# Patient Record
Sex: FEMALE | Race: WHITE | NOT HISPANIC OR LATINO | Employment: PART TIME | URBAN - NONMETROPOLITAN AREA
[De-identification: names, ages, dates, MRNs, and addresses within clinical notes are randomized per-mention and may not be internally consistent; named-entity substitution may affect disease eponyms.]

---

## 2017-02-10 ENCOUNTER — HOSPITAL ENCOUNTER (EMERGENCY)
Facility: HOSPITAL | Age: 32
Discharge: HOME | End: 2017-02-10
Admitting: FAMILY MEDICINE

## 2017-02-10 ENCOUNTER — APPOINTMENT (OUTPATIENT)
Dept: CT IMAGING | Facility: HOSPITAL | Age: 32
End: 2017-02-10

## 2017-02-10 VITALS
BODY MASS INDEX: 33.12 KG/M2 | WEIGHT: 194 LBS | DIASTOLIC BLOOD PRESSURE: 84 MMHG | SYSTOLIC BLOOD PRESSURE: 132 MMHG | TEMPERATURE: 97.8 F | HEIGHT: 64 IN | OXYGEN SATURATION: 98 % | RESPIRATION RATE: 18 BRPM | HEART RATE: 70 BPM

## 2017-02-10 DIAGNOSIS — J02.0 STREP PHARYNGITIS: ICD-10-CM

## 2017-02-10 DIAGNOSIS — R10.9 ABDOMINAL PAIN, UNSPECIFIED LOCATION: Primary | ICD-10-CM

## 2017-02-10 LAB
ALBUMIN SERPL-MCNC: 4.5 G/DL (ref 3.5–5)
ALBUMIN/GLOB SERPL: 1.4 G/DL (ref 1.1–2.5)
ALP SERPL-CCNC: 99 U/L (ref 24–120)
ALT SERPL W P-5'-P-CCNC: 35 U/L (ref 0–54)
AMYLASE SERPL-CCNC: 78 U/L (ref 30–110)
ANION GAP SERPL CALCULATED.3IONS-SCNC: 10 MMOL/L (ref 4–13)
APTT PPP: 36.5 SECONDS (ref 24.1–34.8)
AST SERPL-CCNC: 20 U/L (ref 7–45)
BASOPHILS # BLD AUTO: 0.03 10*3/MM3 (ref 0–0.2)
BASOPHILS NFR BLD AUTO: 0.4 % (ref 0–2)
BILIRUB SERPL-MCNC: 0.8 MG/DL (ref 0.1–1)
BILIRUB UR QL STRIP: NEGATIVE
BUN BLD-MCNC: 11 MG/DL (ref 5–21)
BUN/CREAT SERPL: 19.6 (ref 7–25)
CALCIUM SPEC-SCNC: 9.8 MG/DL (ref 8.4–10.4)
CHLORIDE SERPL-SCNC: 99 MMOL/L (ref 98–110)
CLARITY UR: ABNORMAL
CO2 SERPL-SCNC: 28 MMOL/L (ref 24–31)
COLOR UR: YELLOW
CREAT BLD-MCNC: 0.56 MG/DL (ref 0.5–1.4)
CRP SERPL-MCNC: 1.91 MG/DL (ref 0–0.99)
DEPRECATED RDW RBC AUTO: 39.7 FL (ref 40–54)
EOSINOPHIL # BLD AUTO: 0.04 10*3/MM3 (ref 0–0.7)
EOSINOPHIL NFR BLD AUTO: 0.5 % (ref 0–4)
ERYTHROCYTE [DISTWIDTH] IN BLOOD BY AUTOMATED COUNT: 12.1 % (ref 12–15)
FLUAV AG NPH QL: NEGATIVE
FLUBV AG NPH QL IA: NEGATIVE
GFR SERPL CREATININE-BSD FRML MDRD: 127 ML/MIN/1.73
GLOBULIN UR ELPH-MCNC: 3.2 GM/DL
GLUCOSE BLD-MCNC: 99 MG/DL (ref 70–100)
GLUCOSE UR STRIP-MCNC: NEGATIVE MG/DL
HCT VFR BLD AUTO: 40.4 % (ref 37–47)
HGB BLD-MCNC: 13.9 G/DL (ref 12–16)
HGB UR QL STRIP.AUTO: NEGATIVE
IMM GRANULOCYTES # BLD: 0.01 10*3/MM3 (ref 0–0.03)
IMM GRANULOCYTES NFR BLD: 0.1 % (ref 0–5)
INR PPP: 1.04 (ref 0.91–1.09)
KETONES UR QL STRIP: ABNORMAL
LEUKOCYTE ESTERASE UR QL STRIP.AUTO: NEGATIVE
LIPASE SERPL-CCNC: 57 U/L (ref 23–203)
LYMPHOCYTES # BLD AUTO: 1.38 10*3/MM3 (ref 0.72–4.86)
LYMPHOCYTES NFR BLD AUTO: 18.8 % (ref 15–45)
MCH RBC QN AUTO: 31.2 PG (ref 28–32)
MCHC RBC AUTO-ENTMCNC: 34.4 G/DL (ref 33–36)
MCV RBC AUTO: 90.6 FL (ref 82–98)
MONOCYTES # BLD AUTO: 0.31 10*3/MM3 (ref 0.19–1.3)
MONOCYTES NFR BLD AUTO: 4.2 % (ref 4–12)
NEUTROPHILS # BLD AUTO: 5.56 10*3/MM3 (ref 1.87–8.4)
NEUTROPHILS NFR BLD AUTO: 76 % (ref 39–78)
NITRITE UR QL STRIP: NEGATIVE
PH UR STRIP.AUTO: 7.5 [PH] (ref 5–8)
PLATELET # BLD AUTO: 225 10*3/MM3 (ref 130–400)
PMV BLD AUTO: 10.9 FL (ref 6–12)
POTASSIUM BLD-SCNC: 4 MMOL/L (ref 3.5–5.3)
PROT SERPL-MCNC: 7.7 G/DL (ref 6.3–8.7)
PROT UR QL STRIP: ABNORMAL
PROTHROMBIN TIME: 13.9 SECONDS (ref 11.9–14.6)
RBC # BLD AUTO: 4.46 10*6/MM3 (ref 4.2–5.4)
S PYO AG THROAT QL: POSITIVE
SODIUM BLD-SCNC: 137 MMOL/L (ref 135–145)
SP GR UR STRIP: 1.03 (ref 1–1.03)
UROBILINOGEN UR QL STRIP: ABNORMAL
WBC NRBC COR # BLD: 7.33 10*3/MM3 (ref 4.8–10.8)

## 2017-02-10 PROCEDURE — 0 IOPAMIDOL 61 % SOLUTION: Performed by: NURSE PRACTITIONER

## 2017-02-10 PROCEDURE — 81003 URINALYSIS AUTO W/O SCOPE: CPT | Performed by: NURSE PRACTITIONER

## 2017-02-10 PROCEDURE — 83690 ASSAY OF LIPASE: CPT | Performed by: NURSE PRACTITIONER

## 2017-02-10 PROCEDURE — 86140 C-REACTIVE PROTEIN: CPT | Performed by: NURSE PRACTITIONER

## 2017-02-10 PROCEDURE — 74177 CT ABD & PELVIS W/CONTRAST: CPT

## 2017-02-10 PROCEDURE — 85025 COMPLETE CBC W/AUTO DIFF WBC: CPT | Performed by: NURSE PRACTITIONER

## 2017-02-10 PROCEDURE — 85610 PROTHROMBIN TIME: CPT | Performed by: NURSE PRACTITIONER

## 2017-02-10 PROCEDURE — 87804 INFLUENZA ASSAY W/OPTIC: CPT | Performed by: NURSE PRACTITIONER

## 2017-02-10 PROCEDURE — 96360 HYDRATION IV INFUSION INIT: CPT

## 2017-02-10 PROCEDURE — 99284 EMERGENCY DEPT VISIT MOD MDM: CPT

## 2017-02-10 PROCEDURE — 80053 COMPREHEN METABOLIC PANEL: CPT | Performed by: NURSE PRACTITIONER

## 2017-02-10 PROCEDURE — 85730 THROMBOPLASTIN TIME PARTIAL: CPT | Performed by: NURSE PRACTITIONER

## 2017-02-10 PROCEDURE — 82150 ASSAY OF AMYLASE: CPT | Performed by: NURSE PRACTITIONER

## 2017-02-10 PROCEDURE — 87880 STREP A ASSAY W/OPTIC: CPT | Performed by: NURSE PRACTITIONER

## 2017-02-10 RX ORDER — ONDANSETRON 4 MG/1
4 TABLET, ORALLY DISINTEGRATING ORAL EVERY 6 HOURS PRN
Qty: 12 TABLET | Refills: 0 | Status: SHIPPED | OUTPATIENT
Start: 2017-02-10 | End: 2017-02-13

## 2017-02-10 RX ORDER — SODIUM CHLORIDE 0.9 % (FLUSH) 0.9 %
10 SYRINGE (ML) INJECTION AS NEEDED
Status: DISCONTINUED | OUTPATIENT
Start: 2017-02-10 | End: 2017-02-11 | Stop reason: HOSPADM

## 2017-02-10 RX ORDER — AMOXICILLIN AND CLAVULANATE POTASSIUM 875; 125 MG/1; MG/1
1 TABLET, FILM COATED ORAL 2 TIMES DAILY
Qty: 14 TABLET | Refills: 0 | Status: SHIPPED | OUTPATIENT
Start: 2017-02-10 | End: 2017-02-17

## 2017-02-10 RX ORDER — METHYLPREDNISOLONE 4 MG/1
TABLET ORAL
Qty: 21 TABLET | Refills: 0 | OUTPATIENT
Start: 2017-02-10 | End: 2018-12-16 | Stop reason: HOSPADM

## 2017-02-10 RX ADMIN — SODIUM CHLORIDE 1000 ML: 9 INJECTION, SOLUTION INTRAVENOUS at 20:45

## 2017-02-10 RX ADMIN — IOPAMIDOL 100 ML: 612 INJECTION, SOLUTION INTRAVENOUS at 22:18

## 2017-02-11 NOTE — ED PROVIDER NOTES
Subjective   HPI Comments: Patient is a 31-year-old white female presents here today with several complaints.  Patient reports that she has been having right lower quadrant pain for 3 days.  She reports nausea.  She denies any constipation or diarrhea she denies any difficulties.  Patient has vaginal bleeding or discharge.  Patient also reports bilateral earache and sore throat.  She presents  For past 2 days.  Patient reports also that she does have a cough.  She reports that she called apply last evening.  States this occurred times one.  She denies any night sweats she denies any exposure to tuberculosis.  Patient presents ER today for further evaluation.    Patient is a 31 y.o. female presenting with abdominal pain.   History provided by:  Patient   used: No    Abdominal Pain   Pain location:  RLQ  Pain quality: cramping    Pain radiates to:  Does not radiate  Pain severity:  Mild  Onset quality:  Sudden  Duration:  3 days  Timing:  Constant  Progression:  Worsening  Chronicity:  New  Context: not alcohol use, not awakening from sleep, not diet changes, not eating, not laxative use, not medication withdrawal, not previous surgeries, not recent illness, not recent sexual activity, not recent travel, not retching, not sick contacts, not suspicious food intake and not trauma    Relieved by:  Nothing  Worsened by:  Nothing  Ineffective treatments:  None tried  Associated symptoms: cough and nausea    Associated symptoms: no anorexia, no belching, no chest pain, no chills, no constipation, no diarrhea, no dysuria, no fatigue, no fever, no flatus, no hematemesis, no hematochezia, no hematuria, no melena, no shortness of breath, no sore throat, no vaginal bleeding, no vaginal discharge and no vomiting    Risk factors: no alcohol abuse, no aspirin use, not elderly, has not had multiple surgeries, no NSAID use, not obese, not pregnant and no recent hospitalization        Review of Systems    Constitutional: Negative for chills, fatigue and fever.   HENT: Negative for sore throat.    Respiratory: Positive for cough. Negative for shortness of breath.    Cardiovascular: Negative for chest pain.   Gastrointestinal: Positive for abdominal pain and nausea. Negative for anorexia, constipation, diarrhea, flatus, hematemesis, hematochezia, melena and vomiting.   Genitourinary: Negative for dysuria, hematuria, vaginal bleeding and vaginal discharge.   All other systems reviewed and are negative.      Past Medical History   Diagnosis Date   • Cat scratch fever    • Hypertension    • Precancerous changes of the cervix        No Known Allergies    Past Surgical History   Procedure Laterality Date   •  section     • Neck surgery     • Hysterectomy         History reviewed. No pertinent family history.    Social History     Social History   • Marital status:      Spouse name: N/A   • Number of children: N/A   • Years of education: N/A     Social History Main Topics   • Smoking status: Never Smoker   • Smokeless tobacco: None   • Alcohol use No   • Drug use: No   • Sexual activity: Not Asked     Other Topics Concern   • None     Social History Narrative   • None           Objective   Physical Exam   Constitutional: She is oriented to person, place, and time. She appears well-developed and well-nourished.   HENT:   Head: Normocephalic and atraumatic.   Right Ear: Hearing, external ear and ear canal normal. There is tenderness. Tympanic membrane is erythematous.   Left Ear: Hearing, tympanic membrane, external ear and ear canal normal.   Eyes: Conjunctivae are normal. Pupils are equal, round, and reactive to light.   Neck: Normal range of motion. Neck supple.   Cardiovascular: Normal rate, regular rhythm and normal heart sounds.    Pulmonary/Chest: Effort normal and breath sounds normal.   Abdominal: Soft. Normal appearance and bowel sounds are normal. There is tenderness in the right lower quadrant.    Musculoskeletal: Normal range of motion.   Neurological: She is alert and oriented to person, place, and time.   Skin: Skin is warm and dry.   Psychiatric: She has a normal mood and affect.   Nursing note and vitals reviewed.      Procedures         ED Course  ED Course   Comment By Time   Is reviewed at this time.  Her CBC was unremarkable.  CMP unremarkable.  Her CRP is 1.9, strep a test positive.  CT abdomen and pelvis shows no acute findings.  This was read by the [facility] radiology.  At this time patient be discharged home urination.  I will give her prescription for Augmentin to treat the strep infection.  Patient is advised to follow-up your primary care provider on Monday for recheck.  I will also give her a prescription for Medrol Dosepak to treat the strep throat.  Patient is advised to return to the ER as needed sooner if any new or worsening symptoms. Meeta Samuel, APRN 02/10 2301                  MDM  Number of Diagnoses or Management Options  Abdominal pain, unspecified location: new and requires workup  Strep pharyngitis: new and requires workup     Amount and/or Complexity of Data Reviewed  Clinical lab tests: ordered and reviewed  Tests in the radiology section of CPT®: ordered and reviewed    Patient Progress  Patient progress: stable      Final diagnoses:   Abdominal pain, unspecified location   Strep pharyngitis            Meeta Samuel, LYUBOV  02/10/17 3860

## 2017-02-11 NOTE — ED NOTES
Additional pillow obtained for pt's comfort. No other request at this time.     Cinthia Vogel RN  02/10/17 3562

## 2017-02-11 NOTE — DISCHARGE INSTRUCTIONS
Please use medications as prescribed.  Follow-up to primary care provider on Monday.  Return to ER as needed sooner if any new or worsening symptoms.

## 2017-10-10 ENCOUNTER — HOSPITAL ENCOUNTER (EMERGENCY)
Facility: HOSPITAL | Age: 32
Discharge: HOME | End: 2017-10-10
Attending: EMERGENCY MEDICINE | Admitting: EMERGENCY MEDICINE

## 2017-10-10 VITALS
TEMPERATURE: 98.5 F | HEART RATE: 90 BPM | SYSTOLIC BLOOD PRESSURE: 145 MMHG | OXYGEN SATURATION: 98 % | BODY MASS INDEX: 36.55 KG/M2 | RESPIRATION RATE: 14 BRPM | HEIGHT: 64 IN | DIASTOLIC BLOOD PRESSURE: 86 MMHG | WEIGHT: 214.1 LBS

## 2017-10-10 DIAGNOSIS — L50.9 URTICARIA: Primary | ICD-10-CM

## 2017-10-10 LAB
ALBUMIN SERPL-MCNC: 4.3 G/DL (ref 3.5–5)
ALBUMIN/GLOB SERPL: 1.4 G/DL (ref 1.1–2.5)
ALP SERPL-CCNC: 109 U/L (ref 24–120)
ALT SERPL W P-5'-P-CCNC: 34 U/L (ref 0–54)
ANION GAP SERPL CALCULATED.3IONS-SCNC: 8 MMOL/L (ref 4–13)
AST SERPL-CCNC: 25 U/L (ref 7–45)
BASOPHILS # BLD AUTO: 0.01 10*3/MM3 (ref 0–0.2)
BASOPHILS NFR BLD AUTO: 0.1 % (ref 0–2)
BILIRUB SERPL-MCNC: 0.3 MG/DL (ref 0.1–1)
BUN BLD-MCNC: 17 MG/DL (ref 5–21)
BUN/CREAT SERPL: 23 (ref 7–25)
CALCIUM SPEC-SCNC: 9.5 MG/DL (ref 8.4–10.4)
CHLORIDE SERPL-SCNC: 103 MMOL/L (ref 98–110)
CO2 SERPL-SCNC: 28 MMOL/L (ref 24–31)
CREAT BLD-MCNC: 0.74 MG/DL (ref 0.5–1.4)
DEPRECATED RDW RBC AUTO: 39.4 FL (ref 40–54)
EOSINOPHIL # BLD AUTO: 0.03 10*3/MM3 (ref 0–0.7)
EOSINOPHIL NFR BLD AUTO: 0.3 % (ref 0–4)
ERYTHROCYTE [DISTWIDTH] IN BLOOD BY AUTOMATED COUNT: 12.3 % (ref 12–15)
GFR SERPL CREATININE-BSD FRML MDRD: 92 ML/MIN/1.73
GLOBULIN UR ELPH-MCNC: 3.1 GM/DL
GLUCOSE BLD-MCNC: 88 MG/DL (ref 70–100)
HCT VFR BLD AUTO: 41.7 % (ref 37–47)
HGB BLD-MCNC: 14.3 G/DL (ref 12–16)
IMM GRANULOCYTES # BLD: 0.03 10*3/MM3 (ref 0–0.03)
IMM GRANULOCYTES NFR BLD: 0.3 % (ref 0–5)
LYMPHOCYTES # BLD AUTO: 1.13 10*3/MM3 (ref 0.72–4.86)
LYMPHOCYTES NFR BLD AUTO: 10.2 % (ref 15–45)
MCH RBC QN AUTO: 30.4 PG (ref 28–32)
MCHC RBC AUTO-ENTMCNC: 34.3 G/DL (ref 33–36)
MCV RBC AUTO: 88.7 FL (ref 82–98)
MONOCYTES # BLD AUTO: 0.48 10*3/MM3 (ref 0.19–1.3)
MONOCYTES NFR BLD AUTO: 4.3 % (ref 4–12)
NEUTROPHILS # BLD AUTO: 9.4 10*3/MM3 (ref 1.87–8.4)
NEUTROPHILS NFR BLD AUTO: 84.8 % (ref 39–78)
PLATELET # BLD AUTO: 273 10*3/MM3 (ref 130–400)
PMV BLD AUTO: 10.8 FL (ref 6–12)
POTASSIUM BLD-SCNC: 4.4 MMOL/L (ref 3.5–5.3)
PROT SERPL-MCNC: 7.4 G/DL (ref 6.3–8.7)
RBC # BLD AUTO: 4.7 10*6/MM3 (ref 4.2–5.4)
SODIUM BLD-SCNC: 139 MMOL/L (ref 135–145)
WBC NRBC COR # BLD: 11.08 10*3/MM3 (ref 4.8–10.8)

## 2017-10-10 PROCEDURE — 25010000002 METHYLPREDNISOLONE PER 125 MG: Performed by: EMERGENCY MEDICINE

## 2017-10-10 PROCEDURE — 96361 HYDRATE IV INFUSION ADD-ON: CPT

## 2017-10-10 PROCEDURE — 25010000002 DIPHENHYDRAMINE PER 50 MG: Performed by: EMERGENCY MEDICINE

## 2017-10-10 PROCEDURE — 96374 THER/PROPH/DIAG INJ IV PUSH: CPT

## 2017-10-10 PROCEDURE — 96375 TX/PRO/DX INJ NEW DRUG ADDON: CPT

## 2017-10-10 PROCEDURE — 80053 COMPREHEN METABOLIC PANEL: CPT | Performed by: EMERGENCY MEDICINE

## 2017-10-10 PROCEDURE — 85025 COMPLETE CBC W/AUTO DIFF WBC: CPT | Performed by: EMERGENCY MEDICINE

## 2017-10-10 PROCEDURE — 99283 EMERGENCY DEPT VISIT LOW MDM: CPT

## 2017-10-10 RX ORDER — DIPHENHYDRAMINE HCL 25 MG
25 CAPSULE ORAL EVERY 6 HOURS PRN
Qty: 15 CAPSULE | Refills: 0 | Status: SHIPPED | OUTPATIENT
Start: 2017-10-10 | End: 2019-01-29

## 2017-10-10 RX ORDER — METHYLPREDNISOLONE SODIUM SUCCINATE 125 MG/2ML
125 INJECTION, POWDER, LYOPHILIZED, FOR SOLUTION INTRAMUSCULAR; INTRAVENOUS ONCE
Status: COMPLETED | OUTPATIENT
Start: 2017-10-10 | End: 2017-10-10

## 2017-10-10 RX ORDER — PREDNISONE 20 MG/1
20 TABLET ORAL DAILY
Qty: 5 TABLET | Refills: 0 | OUTPATIENT
Start: 2017-10-10 | End: 2018-12-16 | Stop reason: HOSPADM

## 2017-10-10 RX ORDER — DIPHENHYDRAMINE HYDROCHLORIDE 50 MG/ML
25 INJECTION INTRAMUSCULAR; INTRAVENOUS ONCE
Status: COMPLETED | OUTPATIENT
Start: 2017-10-10 | End: 2017-10-10

## 2017-10-10 RX ORDER — FAMOTIDINE 20 MG/1
20 TABLET, FILM COATED ORAL 2 TIMES DAILY
Qty: 14 TABLET | Refills: 0 | Status: SHIPPED | OUTPATIENT
Start: 2017-10-10 | End: 2019-01-29

## 2017-10-10 RX ORDER — FAMOTIDINE 10 MG/ML
20 INJECTION, SOLUTION INTRAVENOUS ONCE
Status: COMPLETED | OUTPATIENT
Start: 2017-10-10 | End: 2017-10-10

## 2017-10-10 RX ORDER — SODIUM CHLORIDE 0.9 % (FLUSH) 0.9 %
10 SYRINGE (ML) INJECTION AS NEEDED
Status: DISCONTINUED | OUTPATIENT
Start: 2017-10-10 | End: 2017-10-10 | Stop reason: HOSPADM

## 2017-10-10 RX ADMIN — METHYLPREDNISOLONE SODIUM SUCCINATE 125 MG: 125 INJECTION, POWDER, FOR SOLUTION INTRAMUSCULAR; INTRAVENOUS at 08:47

## 2017-10-10 RX ADMIN — DIPHENHYDRAMINE HYDROCHLORIDE 25 MG: 50 INJECTION, SOLUTION INTRAMUSCULAR; INTRAVENOUS at 08:49

## 2017-10-10 RX ADMIN — SODIUM CHLORIDE 500 ML: 9 INJECTION, SOLUTION INTRAVENOUS at 08:55

## 2017-10-10 RX ADMIN — FAMOTIDINE 20 MG: 10 INJECTION, SOLUTION INTRAVENOUS at 08:50

## 2017-10-10 NOTE — ED PROVIDER NOTES
Subjective patient is a 31-year-old female who presents to the ER with a rash.  Patient states she first noticed a red rash approximately 24 hours ago.  Patient states this started on her arms and abdomen and has spread diffusely throughout her entire body.  Patient states the rash is itchy and painful.  Patient has never had anything like this previously.  Patient states she feels like her lip is swelling but denies any significant shortness of air.  Patient denies any new medications, soaps, detergents, foods.  Patient does not take daily medications.  Patient has taken Benadryl at home with no improvement.  Patient denies any fever, chest pain, shortness of breath, abdominal pain, nausea vomiting diarrhea, urinary changes, neurological changes.  Patient denies any recent illness.    History provided by:  Patient   used: No        Review of Systems   Constitutional: Negative.    HENT: Negative.    Eyes: Negative.    Respiratory: Negative.    Cardiovascular: Negative.    Gastrointestinal: Negative.    Endocrine: Negative.    Genitourinary: Negative.    Musculoskeletal: Negative.    Skin: Positive for rash.   Allergic/Immunologic: Negative.    Neurological: Negative.    Hematological: Negative.    Psychiatric/Behavioral: Negative.    All other systems reviewed and are negative.      Past Medical History:   Diagnosis Date   • Cat scratch fever    • Hypertension    • Precancerous changes of the cervix        No Known Allergies    Past Surgical History:   Procedure Laterality Date   •  SECTION     • HYSTERECTOMY     • NECK SURGERY         History reviewed. No pertinent family history.    Social History     Social History   • Marital status:      Spouse name: N/A   • Number of children: N/A   • Years of education: N/A     Social History Main Topics   • Smoking status: Never Smoker   • Smokeless tobacco: None   • Alcohol use No   • Drug use: No   • Sexual activity: Not Asked     Other  Topics Concern   • None     Social History Narrative           Objective   Physical Exam   Constitutional: She is oriented to person, place, and time. She appears well-developed and well-nourished.   HENT:   Head: Normocephalic and atraumatic.   Mouth/Throat: Oropharynx is clear and moist. No posterior oropharyngeal edema or posterior oropharyngeal erythema. No tonsillar exudate.   No tongue swelling, no obvious lip swelling   Eyes: Conjunctivae are normal. Pupils are equal, round, and reactive to light.   Neck: Normal range of motion.   Cardiovascular: Normal rate, regular rhythm and normal heart sounds.    Pulmonary/Chest: Effort normal and breath sounds normal.   Abdominal: Soft. There is no tenderness.   Musculoskeletal: Normal range of motion. She exhibits no edema or deformity.   Neurological: She is alert and oriented to person, place, and time. She has normal strength.   Skin: Skin is warm.   Erythematous macular rash noted diffusely throughout the body consistent with urticaria not involving the palms and soles, some areas are more raised and palpable   Psychiatric: She has a normal mood and affect. Her behavior is normal.   Nursing note and vitals reviewed.      Procedures         ED Course  ED Course    Patient is given IV fluids, Solu-Medrol, Benadryl and Pepcid.  Symptoms improved.  Labs are unremarkable except mild leukocytosis.  Workup consistent with urticaria versus vasculitis.  Patient was feeling better and had no difficulty breathing.  Normal vital signs.  Patient will be discharged home with prednisone, Pepcid and Benadryl to follow up with PCP.  Patient may need further workup or biopsy if symptoms persist.  Return immediately for any worsening or new rash, trouble breathing or swallowing, fever or other concerns.    Lab Results (last 24 hours)     Procedure Component Value Units Date/Time    CBC & Differential [[id number]] Collected:  10/10/17 0845    Specimen:  Blood Updated:  10/10/17 0910     Narrative:       The following orders were created for panel order CBC & Differential.  Procedure                               Abnormality         Status                     ---------                               -----------         ------                     CBC Auto Differential[[id number]]         Abnormal            Final result                 Please view results for these tests on the individual orders.    Comprehensive Metabolic Panel [[id number]] Collected:  10/10/17 0845    Specimen:  Blood from Arm, Right Updated:  10/10/17 0917     Glucose 88 mg/dL      BUN 17 mg/dL      Creatinine 0.74 mg/dL      Sodium 139 mmol/L      Potassium 4.4 mmol/L      Chloride 103 mmol/L      CO2 28.0 mmol/L      Calcium 9.5 mg/dL      Total Protein 7.4 g/dL      Albumin 4.30 g/dL      ALT (SGPT) 34 U/L      AST (SGOT) 25 U/L      Alkaline Phosphatase 109 U/L      Total Bilirubin 0.3 mg/dL      eGFR Non African Amer 92 mL/min/1.73      Globulin 3.1 gm/dL      A/G Ratio 1.4 g/dL      BUN/Creatinine Ratio 23.0     Anion Gap 8.0 mmol/L     CBC Auto Differential [[id number]]  (Abnormal) Collected:  10/10/17 0845    Specimen:  Blood from Arm, Right Updated:  10/10/17 0910     WBC 11.08 (H) 10*3/mm3      RBC 4.70 10*6/mm3      Hemoglobin 14.3 g/dL      Hematocrit 41.7 %      MCV 88.7 fL      MCH 30.4 pg      MCHC 34.3 g/dL      RDW 12.3 %      RDW-SD 39.4 (L) fl      MPV 10.8 fL      Platelets 273 10*3/mm3      Neutrophil % 84.8 (H) %      Lymphocyte % 10.2 (L) %      Monocyte % 4.3 %      Eosinophil % 0.3 %      Basophil % 0.1 %      Immature Grans % 0.3 %      Neutrophils, Absolute 9.40 (H) 10*3/mm3      Lymphocytes, Absolute 1.13 10*3/mm3      Monocytes, Absolute 0.48 10*3/mm3      Eosinophils, Absolute 0.03 10*3/mm3      Basophils, Absolute 0.01 10*3/mm3      Immature Grans, Absolute 0.03 10*3/mm3                     MDM  Number of Diagnoses or Management Options      Final diagnoses:   Urticaria            Sofía CHASE Schultz,  MD  10/10/17 1008

## 2017-12-15 ENCOUNTER — TRANSCRIBE ORDERS (OUTPATIENT)
Dept: ADMINISTRATIVE | Facility: HOSPITAL | Age: 32
End: 2017-12-15

## 2017-12-15 DIAGNOSIS — R10.9 ABDOMINAL PAIN, UNSPECIFIED ABDOMINAL LOCATION: ICD-10-CM

## 2017-12-15 DIAGNOSIS — K58.0 IRRITABLE BOWEL SYNDROME WITH DIARRHEA: Primary | ICD-10-CM

## 2017-12-17 ENCOUNTER — HOSPITAL ENCOUNTER (EMERGENCY)
Facility: HOSPITAL | Age: 32
Discharge: HOME | End: 2017-12-17
Admitting: EMERGENCY MEDICINE

## 2017-12-17 VITALS
OXYGEN SATURATION: 98 % | SYSTOLIC BLOOD PRESSURE: 141 MMHG | HEIGHT: 64 IN | BODY MASS INDEX: 37.22 KG/M2 | DIASTOLIC BLOOD PRESSURE: 82 MMHG | TEMPERATURE: 98.3 F | WEIGHT: 218 LBS | HEART RATE: 80 BPM | RESPIRATION RATE: 16 BRPM

## 2017-12-17 DIAGNOSIS — R51.9 NONINTRACTABLE HEADACHE, UNSPECIFIED CHRONICITY PATTERN, UNSPECIFIED HEADACHE TYPE: Primary | ICD-10-CM

## 2017-12-17 PROCEDURE — 25010000002 KETOROLAC TROMETHAMINE PER 15 MG: Performed by: NURSE PRACTITIONER

## 2017-12-17 PROCEDURE — 99283 EMERGENCY DEPT VISIT LOW MDM: CPT

## 2017-12-17 PROCEDURE — 96372 THER/PROPH/DIAG INJ SC/IM: CPT

## 2017-12-17 RX ORDER — KETOROLAC TROMETHAMINE 30 MG/ML
30 INJECTION, SOLUTION INTRAMUSCULAR; INTRAVENOUS ONCE
Status: COMPLETED | OUTPATIENT
Start: 2017-12-17 | End: 2017-12-17

## 2017-12-17 RX ADMIN — KETOROLAC TROMETHAMINE 30 MG: 30 INJECTION INTRAMUSCULAR; INTRAVENOUS at 19:23

## 2017-12-18 NOTE — ED PROVIDER NOTES
Subjective   Patient is a 32 y.o. female presenting with headaches.   History provided by:  Patient   used: No    Headache   Pain location:  L temporal  Quality:  Dull  Radiates to:  Does not radiate  Severity currently:  8/10  Onset quality:  Sudden  Duration:  1 day  Timing:  Constant  Progression:  Unchanged  Chronicity:  Recurrent  Similar to prior headaches: yes    Context: bright light    Context: not activity, not caffeine, not coughing, not defecating, not eating, not stress, not exposure to cold air, not intercourse, not loud noise and not straining    Relieved by:  Nothing  Worsened by:  Nothing  Ineffective treatments:  None tried  Associated symptoms: no abdominal pain, no back pain, no blurred vision, no congestion, no cough, no diarrhea, no dizziness, no drainage, no ear pain, no eye pain, no facial pain, no fatigue, no fever, no focal weakness, no hearing loss, no loss of balance, no myalgias, no nausea, no near-syncope, no neck pain, no neck stiffness, no numbness, no paresthesias, no photophobia, no seizures, no sinus pressure, no sore throat, no swollen glands, no syncope, no tingling, no URI, no visual change, no vomiting and no weakness    Risk factors: no anger, no family hx of SAH, does not have insomnia and lifestyle not sedentary        Review of Systems   Constitutional: Negative for fatigue and fever.   HENT: Negative for congestion, ear pain, hearing loss, postnasal drip, sinus pressure and sore throat.    Eyes: Negative for blurred vision, photophobia and pain.   Respiratory: Negative for cough.    Cardiovascular: Negative for syncope and near-syncope.   Gastrointestinal: Negative for abdominal pain, diarrhea, nausea and vomiting.   Musculoskeletal: Negative for back pain, myalgias, neck pain and neck stiffness.   Neurological: Positive for headaches. Negative for dizziness, focal weakness, seizures, weakness, numbness, paresthesias and loss of balance.   All other  systems reviewed and are negative.      Past Medical History:   Diagnosis Date   • Cat scratch fever    • Hypertension    • Precancerous changes of the cervix        No Known Allergies    Past Surgical History:   Procedure Laterality Date   •  SECTION     • HYSTERECTOMY     • NECK SURGERY         History reviewed. No pertinent family history.    Social History     Social History   • Marital status:      Spouse name: N/A   • Number of children: N/A   • Years of education: N/A     Social History Main Topics   • Smoking status: Never Smoker   • Smokeless tobacco: None   • Alcohol use No   • Drug use: No   • Sexual activity: Not Asked     Other Topics Concern   • None     Social History Narrative           Objective   Physical Exam   Constitutional: She is oriented to person, place, and time. She appears well-developed.   HENT:   Head: Normocephalic and atraumatic.   Right Ear: External ear normal.   Left Ear: External ear normal.   Nose: Nose normal.   Mouth/Throat: Oropharynx is clear and moist.   Cardiovascular: Normal rate, regular rhythm and normal heart sounds.    Pulmonary/Chest: Effort normal and breath sounds normal.   Musculoskeletal: Normal range of motion.   Neurological: She is alert and oriented to person, place, and time.   Skin: Skin is warm and dry.   Psychiatric: She has a normal mood and affect.   Nursing note and vitals reviewed.      Procedures         ED Course  ED Course   Comment By Time   Patient request nonnarcotic medication as she does have to work tonight. We will give her a toradol injection. Patient declines a workup for her headache including laboratory aspirin.  He states the same as her normal headache.  At this time she will be discharged home in stable condition advised to return to ER if any new or worsening symptoms. Meeta Samuel, APRN 1918                  [facility]  Number of Diagnoses or Management Options  Nonintractable headache, unspecified chronicity  pattern, unspecified headache type: established and worsening  Patient Progress  Patient progress: stable      Final diagnoses:   Nonintractable headache, unspecified chronicity pattern, unspecified headache type            Meeta Samuel, APRN  12/17/17 1919

## 2017-12-19 ENCOUNTER — HOSPITAL ENCOUNTER (OUTPATIENT)
Dept: NUCLEAR MEDICINE | Facility: HOSPITAL | Age: 32
Discharge: HOME | End: 2017-12-19

## 2017-12-19 ENCOUNTER — HOSPITAL ENCOUNTER (OUTPATIENT)
Dept: ULTRASOUND IMAGING | Facility: HOSPITAL | Age: 32
Discharge: HOME | End: 2017-12-19
Admitting: NURSE PRACTITIONER

## 2017-12-19 DIAGNOSIS — R10.9 ABDOMINAL PAIN, UNSPECIFIED ABDOMINAL LOCATION: ICD-10-CM

## 2017-12-19 DIAGNOSIS — K58.0 IRRITABLE BOWEL SYNDROME WITH DIARRHEA: ICD-10-CM

## 2017-12-19 PROCEDURE — A9537 TC99M MEBROFENIN: HCPCS | Performed by: NURSE PRACTITIONER

## 2017-12-19 PROCEDURE — 76705 ECHO EXAM OF ABDOMEN: CPT

## 2017-12-19 PROCEDURE — 78227 HEPATOBIL SYST IMAGE W/DRUG: CPT

## 2017-12-19 PROCEDURE — 0 TECHNETIUM TC 99M MEBROFENIN KIT: Performed by: NURSE PRACTITIONER

## 2017-12-19 RX ORDER — KIT FOR THE PREPARATION OF TECHNETIUM TC 99M MEBROFENIN 45 MG/10ML
1 INJECTION, POWDER, LYOPHILIZED, FOR SOLUTION INTRAVENOUS
Status: COMPLETED | OUTPATIENT
Start: 2017-12-19 | End: 2017-12-19

## 2017-12-19 RX ADMIN — MEBROFENIN 1 DOSE: 45 INJECTION, POWDER, LYOPHILIZED, FOR SOLUTION INTRAVENOUS at 12:15

## 2018-07-16 ENCOUNTER — HOSPITAL ENCOUNTER (EMERGENCY)
Facility: HOSPITAL | Age: 33
Discharge: HOME | End: 2018-07-16
Admitting: EMERGENCY MEDICINE

## 2018-07-16 VITALS
HEIGHT: 64 IN | SYSTOLIC BLOOD PRESSURE: 136 MMHG | DIASTOLIC BLOOD PRESSURE: 88 MMHG | RESPIRATION RATE: 18 BRPM | HEART RATE: 72 BPM | WEIGHT: 190 LBS | TEMPERATURE: 97 F | OXYGEN SATURATION: 99 % | BODY MASS INDEX: 32.44 KG/M2

## 2018-07-16 DIAGNOSIS — H57.89 IRRITATION OF RIGHT EYE: Primary | ICD-10-CM

## 2018-07-16 PROCEDURE — 99283 EMERGENCY DEPT VISIT LOW MDM: CPT

## 2018-07-16 RX ADMIN — FLUORESCEIN SODIUM AND PROPARACAINE HYDROCHLORIDE 1 DROP: 2.5; 5 SOLUTION/ DROPS OPHTHALMIC at 07:34

## 2018-12-16 ENCOUNTER — HOSPITAL ENCOUNTER (EMERGENCY)
Facility: HOSPITAL | Age: 33
Discharge: HOME | End: 2018-12-16
Admitting: EMERGENCY MEDICINE

## 2018-12-16 ENCOUNTER — APPOINTMENT (OUTPATIENT)
Dept: GENERAL RADIOLOGY | Facility: HOSPITAL | Age: 33
End: 2018-12-16

## 2018-12-16 VITALS
OXYGEN SATURATION: 100 % | WEIGHT: 234.4 LBS | SYSTOLIC BLOOD PRESSURE: 153 MMHG | BODY MASS INDEX: 40.02 KG/M2 | HEART RATE: 106 BPM | RESPIRATION RATE: 16 BRPM | HEIGHT: 64 IN | DIASTOLIC BLOOD PRESSURE: 109 MMHG | TEMPERATURE: 98.6 F

## 2018-12-16 DIAGNOSIS — Z72.0 TOBACCO ABUSE: ICD-10-CM

## 2018-12-16 DIAGNOSIS — J18.9 PNEUMONIA DUE TO INFECTIOUS ORGANISM, UNSPECIFIED LATERALITY, UNSPECIFIED PART OF LUNG: Primary | ICD-10-CM

## 2018-12-16 LAB
FLUAV AG NPH QL: NEGATIVE
FLUBV AG NPH QL IA: NEGATIVE
S PYO AG THROAT QL: NEGATIVE

## 2018-12-16 PROCEDURE — 94799 UNLISTED PULMONARY SVC/PX: CPT

## 2018-12-16 PROCEDURE — 25010000002 CEFTRIAXONE PER 250 MG: Performed by: NURSE PRACTITIONER

## 2018-12-16 PROCEDURE — 87804 INFLUENZA ASSAY W/OPTIC: CPT | Performed by: NURSE PRACTITIONER

## 2018-12-16 PROCEDURE — 87081 CULTURE SCREEN ONLY: CPT | Performed by: NURSE PRACTITIONER

## 2018-12-16 PROCEDURE — 94640 AIRWAY INHALATION TREATMENT: CPT

## 2018-12-16 PROCEDURE — 71046 X-RAY EXAM CHEST 2 VIEWS: CPT

## 2018-12-16 PROCEDURE — 96372 THER/PROPH/DIAG INJ SC/IM: CPT

## 2018-12-16 PROCEDURE — 99283 EMERGENCY DEPT VISIT LOW MDM: CPT

## 2018-12-16 PROCEDURE — 87880 STREP A ASSAY W/OPTIC: CPT | Performed by: NURSE PRACTITIONER

## 2018-12-16 RX ORDER — IPRATROPIUM BROMIDE AND ALBUTEROL SULFATE 2.5; .5 MG/3ML; MG/3ML
3 SOLUTION RESPIRATORY (INHALATION) ONCE
Status: COMPLETED | OUTPATIENT
Start: 2018-12-16 | End: 2018-12-16

## 2018-12-16 RX ORDER — METHYLPREDNISOLONE 4 MG/1
TABLET ORAL
Qty: 21 TABLET | Refills: 0 | Status: SHIPPED | OUTPATIENT
Start: 2018-12-16 | End: 2019-01-29

## 2018-12-16 RX ORDER — CEFDINIR 300 MG/1
300 CAPSULE ORAL 2 TIMES DAILY
Qty: 20 CAPSULE | Refills: 0 | Status: SHIPPED | OUTPATIENT
Start: 2018-12-16 | End: 2018-12-26

## 2018-12-16 RX ORDER — ALBUTEROL SULFATE 2.5 MG/3ML
2.5 SOLUTION RESPIRATORY (INHALATION) EVERY 4 HOURS PRN
Qty: 25 VIAL | Refills: 0 | Status: SHIPPED | OUTPATIENT
Start: 2018-12-16 | End: 2019-01-29

## 2018-12-16 RX ORDER — DEXTROMETHORPHAN HYDROBROMIDE AND PROMETHAZINE HYDROCHLORIDE 15; 6.25 MG/5ML; MG/5ML
5 SYRUP ORAL 4 TIMES DAILY PRN
Qty: 180 ML | Refills: 0 | Status: SHIPPED | OUTPATIENT
Start: 2018-12-16 | End: 2019-01-29

## 2018-12-16 RX ORDER — IBUPROFEN 800 MG/1
800 TABLET ORAL ONCE
Status: COMPLETED | OUTPATIENT
Start: 2018-12-16 | End: 2018-12-16

## 2018-12-16 RX ADMIN — IPRATROPIUM BROMIDE AND ALBUTEROL SULFATE 3 ML: 2.5; .5 SOLUTION RESPIRATORY (INHALATION) at 08:01

## 2018-12-16 RX ADMIN — CEFTRIAXONE SODIUM 1000 MG: 1 INJECTION, POWDER, FOR SOLUTION INTRAMUSCULAR; INTRAVENOUS at 09:02

## 2018-12-16 RX ADMIN — IBUPROFEN 800 MG: 800 TABLET ORAL at 07:57

## 2018-12-16 NOTE — DISCHARGE INSTRUCTIONS
Return to ER if symptoms worsen   Increase oral fluids  Stop smoking  Follow up with primary doctor in 3 days if needed  Follow up with one of the [facility] physician groups below to setup primary care. If you have trouble following up, please call the [facility] Nurse Line at [phone]    (Dr. Nelson Miller DO, Dr. Maximiliano Sanchez DO,  LYUBOV Jacob, and LYUBOV Hernandez)  [facility], Primary Care   [address], [address], [place] [address]  [phone]     (Dr. May Parish MD, LYUBOV Berry, and LYUBOV Greenberg)  [facility], Primary Care   [address] [facility] [address] [address], [place] [address] [phone]    (Dr. Luis Manuel Lerma MD and Dr. Julian Solomon MD)  [facility], Primary Care  [address], [address] [phone]    (Dr. Godfrey Courtney MD)  [facility], Primary Care  [address], [address], [place], [place], [address] [phone]      Community-Acquired Pneumonia, Adult  Pneumonia is an infection of the lungs. There are different types of pneumonia. One type can develop while a person is in a hospital. A different type, called community-acquired pneumonia, develops in people who are not, or have not recently been, in the hospital or other health care facility.  What are the causes?  Pneumonia may be caused by bacteria, viruses, or funguses. Community-acquired pneumonia is often caused by Streptococcus pneumonia bacteria. These bacteria are often passed from one person to another by breathing in droplets from the cough or sneeze of an infected person.  What increases the risk?  The condition is more likely to develop in:  · People who have chronic diseases, such as chronic obstructive pulmonary disease (COPD), asthma, congestive heart failure, cystic fibrosis, diabetes, or kidney disease.  · People who have early-stage or late-stage  HIV.  · People who have sickle cell disease.  · People who have had their spleen removed (splenectomy).  · People who have poor dental hygiene.  · People who have medical conditions that increase the risk of breathing in (aspirating) secretions their own mouth and nose.  · People who have a weakened immune system (immunocompromised).  · People who smoke.  · People who travel to areas where pneumonia-causing germs commonly exist.  · People who are around animal habitats or animals that have pneumonia-causing germs, including birds, bats, rabbits, cats, and farm animals.    What are the signs or symptoms?  Symptoms of this condition include:  · A dry cough.  · A wet (productive) cough.  · Fever.  · Sweating.  · Chest pain, especially when breathing deeply or coughing.  · Rapid breathing or difficulty breathing.  · Shortness of breath.  · Shaking chills.  · Fatigue.  · Muscle aches.    How is this diagnosed?  Your health care provider will take a medical history and perform a physical exam. You may also have other tests, including:  · Imaging studies of your chest, including X-rays.  · Tests to check your blood oxygen level and other blood gases.  · Other tests on blood, mucus (sputum), fluid around your lungs (pleural fluid), and urine.    If your pneumonia is severe, other tests may be done to identify the specific cause of your illness.  How is this treated?  The type of treatment that you receive depends on many factors, such as the cause of your pneumonia, the medicines you take, and other medical conditions that you have. For most adults, treatment and recovery from pneumonia may occur at home. In some cases, treatment must happen in a hospital. Treatment may include:  · Antibiotic medicines, if the pneumonia was caused by bacteria.  · Antiviral medicines, if the pneumonia was caused by a virus.  · Medicines that are given by mouth or through an IV tube.  · Oxygen.  · Respiratory therapy.    Although rare,  treating severe pneumonia may include:  · Mechanical ventilation. This is done if you are not breathing well on your own and you cannot maintain a safe blood oxygen level.  · Thoracentesis. This procedure removes fluid around one lung or both lungs to help you breathe better.    Follow these instructions at home:  · Take over-the-counter and prescription medicines only as told by your health care provider.  ? Only take cough medicine if you are losing sleep. Understand that cough medicine can prevent your body’s natural ability to remove mucus from your lungs.  ? If you were prescribed an antibiotic medicine, take it as told by your health care provider. Do not stop taking the antibiotic even if you start to feel better.  · Sleep in a semi-upright position at night. Try sleeping in a reclining chair, or place a few pillows under your head.  · Do not use tobacco products, including cigarettes, chewing tobacco, and e-cigarettes. If you need help quitting, ask your health care provider.  · Drink enough water to keep your urine clear or pale yellow. This will help to thin out mucus secretions in your lungs.  How is this prevented?  There are ways that you can decrease your risk of developing community-acquired pneumonia. Consider getting a pneumococcal vaccine if:  · You are older than 65 years of age.  · You are older than 19 years of age and are undergoing cancer treatment, have chronic lung disease, or have other medical conditions that affect your immune system. Ask your health care provider if this applies to you.    There are different types and schedules of pneumococcal vaccines. Ask your health care provider which vaccination option is best for you.  You may also prevent community-acquired pneumonia if you take these actions:  · Get an influenza vaccine every year. Ask your health care provider which type of influenza vaccine is best for you.  · Go to the dentist on a regular basis.  · Wash your hands often. Use  hand  if soap and water are not available.    Contact a health care provider if:  · You have a fever.  · You are losing sleep because you cannot control your cough with cough medicine.  Get help right away if:  · You have worsening shortness of breath.  · You have increased chest pain.  · Your sickness becomes worse, especially if you are an older adult or have a weakened immune system.  · You cough up blood.  This information is not intended to replace advice given to you by your health care provider. Make sure you discuss any questions you have with your health care provider.  Document Released: 12/18/2006 Document Revised: 04/27/2017 Document Reviewed: 04/13/2016  [employer] Interactive Patient Education © 2018 [employer]      Coping with Quitting Smoking  Quitting smoking is a physical and mental challenge. You will face cravings, withdrawal symptoms, and temptation. Before quitting, work with your health care provider to make a plan that can help you cope. Preparation can help you quit and keep you from giving in.  How can I cope with cravings?  Cravings usually last for 5-10 minutes. If you get through it, the craving will pass. Consider taking the following actions to help you cope with cravings:  · Keep your mouth busy:  ? Chew sugar-free gum.  ? Suck on hard candies or a straw.  ? Brush your teeth.  · Keep your hands and body busy:  ? Immediately change to a different activity when you feel a craving.  ? Squeeze or play with a ball.  ? Do an activity or a hobby, like making bead jewelry, practicing needlepoint, or working with wood.  ? Mix up your normal routine.  ? Take a short exercise break. Go for a quick walk or run up and down stairs.  ? Spend time in public places where smoking is not allowed.  · Focus on doing something kind or helpful for someone else.  · Call a friend or family member to talk during a craving.  · Join a support group.  · Call a quit line, such as ThumbAd800-QUIT-NOW.  · Talk  with your health care provider about medicines that might help you cope with cravings and make quitting easier for you.    How can I deal with withdrawal symptoms?  Your body may experience negative effects as it tries to get used to not having nicotine in the system. These effects are called withdrawal symptoms. They may include:  · Feeling hungrier than normal.  · Trouble concentrating.  · Irritability.  · Trouble sleeping.  · Feeling depressed.  · Restlessness and agitation.  · Craving a cigarette.    To manage withdrawal symptoms:  · Avoid places, people, and activities that trigger your cravings.  · Remember why you want to quit.  · Get plenty of sleep.  · Avoid coffee and other caffeinated drinks. These may worsen some of your symptoms.    How can I handle social situations?  Social situations can be difficult when you are quitting smoking, especially in the first few weeks. To manage this, you can:  · Avoid parties, bars, and other social situations where people might be smoking.  · Avoid alcohol.  · Leave right away if you have the urge to smoke.  · Explain to your family and friends that you are quitting smoking. Ask for understanding and support.  · Plan activities with friends or family where smoking is not an option.    What are some ways I can cope with stress?  Wanting to smoke may cause stress, and stress can make you want to smoke. Find ways to manage your stress. Relaxation techniques can help. For example:  · Breathe slowly and deeply, in through your nose and out through your mouth.  · Listen to soothing, relaxing music.  · Talk with a family member or friend about your stress.  · Light a candle.  · Soak in a bath or take a shower.  · Think about a peaceful place.    What are some ways I can prevent weight gain?  Be aware that many people gain weight after they quit smoking. However, not everyone does. To keep from gaining weight, have a plan in place before you quit and stick to the plan after  you quit. Your plan should include:  · Having healthy snacks. When you have a craving, it may help to:  ? Eat plain popcorn, crunchy carrots, celery, or other cut vegetables.  ? Chew sugar-free gum.  · Changing how you eat:  ? Eat small portion sizes at meals.  ? Eat 4-6 small meals throughout the day instead of 1-2 large meals a day.  ? Be mindful when you eat. Do not watch television or do other things that might distract you as you eat.  · Exercising regularly:  ? Make time to exercise each day. If you do not have time for a long workout, do short bouts of exercise for 5-10 minutes several times a day.  ? Do some form of strengthening exercise, like weight lifting, and some form of aerobic exercise, like running or swimming.  · Drinking plenty of water or other low-calorie or no-calorie drinks. Drink 6-8 glasses of water daily, or as much as instructed by your health care provider.    Summary  · Quitting smoking is a physical and mental challenge. You will face cravings, withdrawal symptoms, and temptation to smoke again. Preparation can help you as you go through these challenges.  · You can cope with cravings by keeping your mouth busy (such as by chewing gum), keeping your body and hands busy, and making calls to family, friends, or a helpline for people who want to quit smoking.  · You can cope with withdrawal symptoms by avoiding places where people smoke, avoiding drinks with caffeine, and getting plenty of rest.  · Ask your health care provider about the different ways to prevent weight gain, avoid stress, and handle social situations.  This information is not intended to replace advice given to you by your health care provider. Make sure you discuss any questions you have with your health care provider.  Document Released: 12/15/2017 Document Revised: 12/15/2017 Document Reviewed: 12/15/2017  [employer] Interactive Patient Education © 2018 [employer]      Health Risks of Smoking  Smoking cigarettes is  very bad for your health. Tobacco smoke has over 200 known poisons in it. It contains the poisonous gases nitrogen oxide and carbon monoxide. There are over 60 chemicals in tobacco smoke that cause cancer.  Smoking is difficult to quit because a chemical in tobacco, called nicotine, causes addiction or dependence. When you smoke and inhale, nicotine is absorbed rapidly into the bloodstream through your lungs. Both inhaled and non-inhaled nicotine may be addictive.  What are the risks of cigarette smoke?  Cigarette smokers have an increased risk of many serious medical problems, including:  · Lung cancer.  · Lung disease, such as pneumonia, bronchitis, and emphysema.  · Chest pain (angina) and heart attack because the heart is not getting enough oxygen.  · Heart disease and peripheral blood vessel disease.  · High blood pressure (hypertension).  · Stroke.  · Oral cancer, including cancer of the lip, mouth, or voice box.  · Bladder cancer.  · Pancreatic cancer.  · Cervical cancer.  · Pregnancy complications, including premature birth.  · Stillbirths and smaller  babies, birth defects, and genetic damage to sperm.  · Early menopause.  · Lower estrogen level for women.  · Infertility.  · Facial wrinkles.  · Blindness.  · Increased risk of broken bones (fractures).  · Senile dementia.  · Stomach ulcers and internal bleeding.  · Delayed wound healing and increased risk of complications during surgery.  · Even smoking lightly shortens your life expectancy by several years.    Because of secondhand smoke exposure, children of smokers have an increased risk of the following:  · Sudden infant death syndrome (SIDS).  · Respiratory infections.  · Lung cancer.  · Heart disease.  · Ear infections.    What are the benefits of quitting?  There are many health benefits of quitting smoking. Here are some of them:  · Within days of quitting smoking, your risk of having a heart attack decreases, your blood flow improves, and  your lung capacity improves. Blood pressure, pulse rate, and breathing patterns start returning to normal soon after quitting.  · Within months, your lungs may clear up completely.  · Quitting for 10 years reduces your risk of developing lung cancer and heart disease to almost that of a nonsmoker.  · People who quit may see an improvement in their overall quality of life.    How do I quit smoking?  Smoking is an addiction with both physical and psychological effects, and longtime habits can be hard to change. Your health care provider can recommend:  · Programs and community resources, which may include group support, education, or talk therapy.  · Prescription medicines to help reduce cravings.  · Nicotine replacement products, such as patches, gum, and nasal sprays. Use these products only as directed. Do not replace cigarette smoking with electronic cigarettes, which are commonly called e-cigarettes. The safety of e-cigarettes is not known, and some may contain harmful chemicals.  · A combination of two or more of these methods.    Where to find more information:  · American Lung Association: www.lung.org  · American Cancer Society: www.cancer.org  Summary  · Smoking cigarettes is very bad for your health. Cigarette smokers have an increased risk of many serious medical problems, including several cancers, heart disease, and stroke.  · Smoking is an addiction with both physical and psychological effects, and longtime habits can be hard to change.  · By stopping right away, you can greatly reduce the risk of medical problems for you and your family.  · To help you quit smoking, your health care provider can recommend programs, community resources, prescription medicines, and nicotine replacement products such as patches, gum, and nasal sprays.  This information is not intended to replace advice given to you by your health care provider. Make sure you discuss any questions you have with your health care  provider.  Document Released: 01/25/2006 Document Revised: 12/22/2017 Document Reviewed: 12/22/2017  [employer] Interactive Patient Education © 2017 [employer]

## 2018-12-16 NOTE — ED PROVIDER NOTES
Subjective   Pt is a 32 yo white female presents with cough, congestion, fever, and body aches. She states that she has had interment nausea or vomiting. Pt denies abd pain. No diarrhea. Pt states that she did not get the influenza vaccine.  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    HENT: Negative.    Eyes: Negative.    Respiratory:        Pt is a 32 yo white female presents with cough, congestion, fever, and body aches. She states that she has had interment nausea or vomiting. Pt denies abd pain. No diarrhea. Pt states that she did not get the influenza vaccine.  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    Gastrointestinal: Negative.    Endocrine: Negative.    Genitourinary: Negative.    Musculoskeletal: Negative.    Skin: Negative.    Allergic/Immunologic: Negative.    Neurological: Negative.    Hematological: Negative.    Psychiatric/Behavioral: Negative.    All other systems reviewed and are negative.      Past Medical History:   Diagnosis Date   • Cat scratch fever    • Hypertension    • Precancerous changes of the cervix        No Known Allergies    Past Surgical History:   Procedure Laterality Date   •  SECTION     • HYSTERECTOMY     • NECK SURGERY         History reviewed. No pertinent family history.    Social History     Socioeconomic History   • Marital status:      Spouse name: Not on file   • Number of children: Not on file   • Years of education: Not on file   • Highest education level: Not on file   Tobacco Use   • Smoking status: Never Smoker   • Smokeless tobacco: Never Used   Substance and Sexual Activity   • Alcohol use: No   • Drug use: No   • Sexual activity: Defer  "      Prior to Admission medications    Medication Sig Start Date End Date Taking? Authorizing Provider   diphenhydrAMINE (BENADRYL) 25 mg capsule Take 1 capsule by mouth Every 6 (Six) Hours As Needed for Itching. 10/10/17   Sofía Schultz MD   famotidine (PEPCID) 20 MG tablet Take 1 tablet by mouth 2 (Two) Times a Day. 10/10/17   Sofía Schultz MD   MethylPREDNISolone (MEDROL, BRITTNEY,) 4 MG tablet Take as directed on package instructions. 2/10/17   Meeta Samuel APRN   predniSONE (DELTASONE) 20 MG tablet Take 1 tablet by mouth Daily. 10/10/17   Sofía Schultz MD   VALTREX 1 G tablet Take 1 tablet by mouth Daily. 11/10/16   Liseth Hawkins APRN       BP (!) 148/105 (BP Location: Left arm, Patient Position: Sitting)   Pulse 107   Temp 98.3 °F (36.8 °C) (Oral)   Resp 16   Ht 162.6 cm (64\")   Wt 106 kg (234 lb 6.4 oz)   SpO2 99%   BMI 40.23 kg/m²     Objective   Physical Exam   Constitutional: She is oriented to person, place, and time. She appears well-developed and well-nourished.   HENT:   Head: Normocephalic and atraumatic.   O/p sl eryth wih thick clear pnd. No exudate noted   Eyes: Conjunctivae and EOM are normal. Pupils are equal, round, and reactive to light.   Neck: Normal range of motion. Neck supple. No JVD present. No tracheal deviation present. No thyromegaly present.   Cardiovascular: Normal rate, regular rhythm, normal heart sounds and intact distal pulses.   Pulmonary/Chest: Effort normal. No respiratory distress. She has no wheezes. She has no rales. She exhibits no tenderness.   Mild expiratory wheezing, clears with cough    Abdominal: Soft. Bowel sounds are normal.   Musculoskeletal: Normal range of motion.   Lymphadenopathy:     She has no cervical adenopathy.   Neurological: She is alert and oriented to person, place, and time. She has normal reflexes. No cranial nerve deficit.   Skin: Skin is warm and dry.   Psychiatric: She has a normal mood and affect. Her behavior is " normal. Judgment and thought content normal.   Nursing note and vitals reviewed.      Procedures         Lab Results (last 24 hours)     Procedure Component Value Units Date/Time    Influenza Antigen, Rapid - Swab, Nasopharynx [[id number]]  (Normal) Collected:  12/16/18 0755    Specimen:  Swab from Nasopharynx Updated:  12/16/18 0823     Influenza A Ag, EIA Negative     Influenza B Ag, EIA Negative    Narrative:       Recommend confirmation of negative results by viral culture or molecular assay.    Rapid Strep A Screen - Swab, Throat [[id number]]  (Normal) Collected:  12/16/18 0755    Specimen:  Swab from Throat Updated:  12/16/18 0819     Strep A Ag Negative    Beta Strep Culture, Throat - Swab, Throat [[id number]] Collected:  12/16/18 0755    Specimen:  Swab from Throat Updated:  12/16/18 0819          XR Chest 2 View   Final Result   1. Mild patchy right perihilar airspace opacities, right middle and   right upper lobe, compatible with acute infectious/inflammatory change,   pneumonia.           This report was finalized on 12/16/2018 08:08 by Dr. Vladimir Mcfarland MD.          ED Course  ED Course as of Dec 16 0912   Sun Dec 16, 2018   0856 Reviewed results of testing with pt and pt family. Pt will be sent home with atbs, neb machine, steroids, and cough medications. Advised pt to increase oral fluids. Advised to stop smoking as well. Advised to follow up in 3-4 days if no improvement- advised to return to er if needed  [CW]      ED Course User Index  [CW] Lynne Porras, APRN          MDM  Number of Diagnoses or Management Options  Pneumonia due to infectious organism, unspecified laterality, unspecified part of lung: minor  Tobacco abuse: minor     Amount and/or Complexity of Data Reviewed  Clinical lab tests: ordered and reviewed  Tests in the radiology section of CPT®: ordered and reviewed    Patient Progress  Patient progress: stable      Final diagnoses:   Pneumonia due to infectious organism, unspecified  laterality, unspecified part of lung   Tobacco abuse          Lynne Porras, APRN  12/16/18 0933

## 2018-12-18 LAB — BACTERIA SPEC AEROBE CULT: NORMAL

## 2019-01-29 ENCOUNTER — OFFICE VISIT (OUTPATIENT)
Dept: OBSTETRICS AND GYNECOLOGY | Facility: CLINIC | Age: 34
End: 2019-01-29

## 2019-01-29 VITALS
BODY MASS INDEX: 39.78 KG/M2 | HEIGHT: 64 IN | SYSTOLIC BLOOD PRESSURE: 110 MMHG | WEIGHT: 233 LBS | DIASTOLIC BLOOD PRESSURE: 80 MMHG

## 2019-01-29 DIAGNOSIS — Z78.9 NON-SMOKER: ICD-10-CM

## 2019-01-29 DIAGNOSIS — Z01.419 ENCOUNTER FOR GYNECOLOGICAL EXAMINATION WITHOUT ABNORMAL FINDING: Primary | ICD-10-CM

## 2019-01-29 DIAGNOSIS — Z11.3 SCREEN FOR STD (SEXUALLY TRANSMITTED DISEASE): ICD-10-CM

## 2019-01-29 PROCEDURE — 87481 CANDIDA DNA AMP PROBE: CPT | Performed by: NURSE PRACTITIONER

## 2019-01-29 PROCEDURE — 99395 PREV VISIT EST AGE 18-39: CPT | Performed by: NURSE PRACTITIONER

## 2019-01-29 PROCEDURE — 87661 TRICHOMONAS VAGINALIS AMPLIF: CPT | Performed by: NURSE PRACTITIONER

## 2019-01-29 PROCEDURE — 87798 DETECT AGENT NOS DNA AMP: CPT | Performed by: NURSE PRACTITIONER

## 2019-01-29 PROCEDURE — 87491 CHLMYD TRACH DNA AMP PROBE: CPT | Performed by: NURSE PRACTITIONER

## 2019-01-29 PROCEDURE — 87512 GARDNER VAG DNA QUANT: CPT | Performed by: NURSE PRACTITIONER

## 2019-01-29 PROCEDURE — 87591 N.GONORRHOEAE DNA AMP PROB: CPT | Performed by: NURSE PRACTITIONER

## 2019-01-29 RX ORDER — VALACYCLOVIR HYDROCHLORIDE 1 G/1
1000 TABLET, FILM COATED ORAL DAILY
Qty: 30 TABLET | Refills: 11 | Status: SHIPPED | OUTPATIENT
Start: 2019-01-29 | End: 2022-06-24

## 2019-01-29 RX ORDER — FLUCONAZOLE 150 MG/1
150 TABLET ORAL EVERY OTHER DAY
Qty: 2 TABLET | Refills: 0 | Status: SHIPPED | OUTPATIENT
Start: 2019-01-29 | End: 2019-02-01

## 2019-01-29 RX ORDER — CLOTRIMAZOLE AND BETAMETHASONE DIPROPIONATE 10; .64 MG/G; MG/G
CREAM TOPICAL 2 TIMES DAILY
Qty: 15 G | Refills: 0 | Status: SHIPPED | OUTPATIENT
Start: 2019-01-29 | End: 2019-04-17

## 2019-01-29 NOTE — PATIENT INSTRUCTIONS

## 2019-01-29 NOTE — PROGRESS NOTES
"Ashley Lacy is a 33 y.o. female.     Annual exam    Vagina is numb, do not feel anything with intercourse.       The following portions of the patient's history were reviewed and updated as appropriate: allergies, current medications, past family history, past medical history, past social history, past surgical history and problem list.    /80 (BP Location: Left arm, Patient Position: Sitting, Cuff Size: Large Adult)   Ht 162.6 cm (64\")   Wt 106 kg (233 lb)   BMI 39.99 kg/m²     Review of Systems   Constitutional: Negative for activity change, appetite change, fatigue and fever.   HENT: Negative for congestion, sore throat and trouble swallowing.    Eyes: Negative for pain, discharge and visual disturbance.   Respiratory: Negative for apnea, shortness of breath and wheezing.    Cardiovascular: Negative for chest pain, palpitations and leg swelling.   Gastrointestinal: Negative for abdominal pain, constipation and diarrhea.   Genitourinary: Positive for vaginal discharge. Negative for frequency, pelvic pain and urgency.        Vaginal numbness     Musculoskeletal: Negative for back pain and gait problem.   Skin: Negative for color change and rash.   Neurological: Negative for dizziness, weakness and numbness.   Psychiatric/Behavioral: Negative for confusion and sleep disturbance.       Objective   Physical Exam   Constitutional: She is oriented to person, place, and time. She appears well-developed and well-nourished. No distress.   HENT:   Head: Normocephalic.   Right Ear: External ear normal.   Left Ear: External ear normal.   Nose: Nose normal.   Mouth/Throat: Oropharynx is clear and moist.   Eyes: Conjunctivae are normal. Right eye exhibits no discharge. Left eye exhibits no discharge. No scleral icterus.   Neck: Normal range of motion. Neck supple. Carotid bruit is not present. No tracheal deviation present. No thyromegaly present.   Cardiovascular: Normal rate, regular rhythm, normal heart " sounds and intact distal pulses.   No murmur heard.  Pulmonary/Chest: Effort normal and breath sounds normal. No respiratory distress. She has no wheezes. Right breast exhibits no inverted nipple, no mass, no nipple discharge, no skin change and no tenderness. Left breast exhibits no inverted nipple, no mass, no nipple discharge, no skin change and no tenderness. Breasts are symmetrical. There is no breast swelling.   Abdominal: Soft. She exhibits no distension and no mass. There is no tenderness. There is no guarding. No hernia. Hernia confirmed negative in the right inguinal area and confirmed negative in the left inguinal area.   Genitourinary: Rectum normal. Rectal exam shows no mass. No breast tenderness, discharge or bleeding. Pelvic exam was performed with patient supine. There is rash and tenderness on the right labia. There is no lesion or injury on the right labia. There is rash and tenderness on the left labia. There is no lesion or injury on the left labia. Cervix exhibits no discharge. Right adnexum displays no mass, no tenderness and no fullness. Left adnexum displays no mass, no tenderness and no fullness. There is tenderness in the vagina. No erythema or bleeding in the vagina. No foreign body in the vagina. No signs of injury around the vagina. Vaginal discharge found.   Genitourinary Comments:   BSU normal  Urethral meatus  Normal  Perineum  Normal   Musculoskeletal: Normal range of motion. She exhibits no edema or tenderness.   Lymphadenopathy:        Head (right side): No submental, no submandibular, no tonsillar, no preauricular, no posterior auricular and no occipital adenopathy present.        Head (left side): No submental, no submandibular, no tonsillar, no preauricular, no posterior auricular and no occipital adenopathy present.     She has no cervical adenopathy.        Right cervical: No superficial cervical, no deep cervical and no posterior cervical adenopathy present.       Left  cervical: No superficial cervical, no deep cervical and no posterior cervical adenopathy present.     She has no axillary adenopathy.        Right: No inguinal adenopathy present.        Left: No inguinal adenopathy present.   Neurological: She is alert and oriented to person, place, and time. Coordination normal.   Skin: Skin is warm and dry. No bruising and no rash noted. She is not diaphoretic. No erythema.   Psychiatric: She has a normal mood and affect. Her behavior is normal. Judgment and thought content normal.   Nursing note and vitals reviewed.      Assessment/Plan   Well woman exam. Pt reports MD lesley Vega in [facility], jaqueline BSO, r/t adnomyosis    Discussed pt vaginal symptoms.   Specimen collected for BV panel, gonorrhea and chlamydia testing.  Will treat with Diflucan and Lotrisone.     Pt desires refill of Valtrex. Will refill.     Patient's Body mass index is 39.99 kg/m². BMI is above normal parameters. Recommendations include: educational material.    RV well woman exam/pn.   May was seen today for gynecologic exam.    Diagnoses and all orders for this visit:    Encounter for gynecological examination without abnormal finding    Screen for STD (sexually transmitted disease)  -     Gynecologic Fluid, Supplemental Testing    BMI 40.0-44.9, adult (CMS/[facility])    Non-smoker    Other orders  -     clotrimazole-betamethasone (LOTRISONE) 1-0.05 % cream; Apply  topically to the appropriate area as directed 2 (Two) Times a Day.  -     valACYclovir (VALTREX) 1000 MG tablet; Take 1 tablet by mouth Daily.  -     fluconazole (DIFLUCAN) 150 MG tablet; Take 1 tablet by mouth Every Other Day for 2 doses.

## 2019-02-04 LAB
GEN CATEG CVX/VAG CYTO-IMP: NORMAL
LAB AP CASE REPORT: NORMAL
Lab: NORMAL
PATH INTERP SPEC-IMP: NORMAL
STAT OF ADQ CVX/VAG CYTO-IMP: NORMAL

## 2019-02-04 RX ORDER — DOXYCYCLINE 100 MG/1
100 CAPSULE ORAL 2 TIMES DAILY
Qty: 14 CAPSULE | Refills: 0 | Status: SHIPPED | OUTPATIENT
Start: 2019-02-04 | End: 2019-02-11

## 2019-02-28 ENCOUNTER — OFFICE VISIT (OUTPATIENT)
Dept: OBSTETRICS AND GYNECOLOGY | Facility: CLINIC | Age: 34
End: 2019-02-28

## 2019-02-28 VITALS
DIASTOLIC BLOOD PRESSURE: 76 MMHG | SYSTOLIC BLOOD PRESSURE: 118 MMHG | BODY MASS INDEX: 39.44 KG/M2 | WEIGHT: 231 LBS | HEIGHT: 64 IN

## 2019-02-28 DIAGNOSIS — N94.9 LABIAL BURNING: Primary | ICD-10-CM

## 2019-02-28 DIAGNOSIS — Z78.9 NON-SMOKER: ICD-10-CM

## 2019-02-28 PROCEDURE — 99213 OFFICE O/P EST LOW 20 MIN: CPT | Performed by: NURSE PRACTITIONER

## 2019-04-17 ENCOUNTER — PROCEDURE VISIT (OUTPATIENT)
Dept: OBSTETRICS AND GYNECOLOGY | Facility: CLINIC | Age: 34
End: 2019-04-17

## 2019-04-17 ENCOUNTER — OFFICE VISIT (OUTPATIENT)
Dept: OBSTETRICS AND GYNECOLOGY | Facility: CLINIC | Age: 34
End: 2019-04-17

## 2019-04-17 VITALS
BODY MASS INDEX: 40.8 KG/M2 | DIASTOLIC BLOOD PRESSURE: 80 MMHG | WEIGHT: 239 LBS | HEIGHT: 64 IN | SYSTOLIC BLOOD PRESSURE: 130 MMHG

## 2019-04-17 DIAGNOSIS — R10.2 PELVIC PAIN: Primary | ICD-10-CM

## 2019-04-17 DIAGNOSIS — Z78.9 NON-SMOKER: ICD-10-CM

## 2019-04-17 DIAGNOSIS — R53.83 FATIGUE, UNSPECIFIED TYPE: ICD-10-CM

## 2019-04-17 PROCEDURE — 76830 TRANSVAGINAL US NON-OB: CPT | Performed by: OBSTETRICS & GYNECOLOGY

## 2019-04-17 PROCEDURE — 99213 OFFICE O/P EST LOW 20 MIN: CPT | Performed by: NURSE PRACTITIONER

## 2019-04-17 RX ORDER — ERGOCALCIFEROL 1.25 MG/1
CAPSULE ORAL
COMMUNITY
Start: 2019-03-31 | End: 2019-04-25 | Stop reason: SDUPTHER

## 2019-04-17 NOTE — PROGRESS NOTES
"Subjective   May Lacy is a 33 y.o. female.     Follow up  Pelvic pain  US today      The following portions of the patient's history were reviewed and updated as appropriate: allergies, current medications, past family history, past medical history, past social history, past surgical history and problem list.    /80 (BP Location: Left arm, Patient Position: Sitting, Cuff Size: Large Adult)   Ht 162.6 cm (64\")   Wt 108 kg (239 lb)   BMI 41.02 kg/m²     Review of Systems   Constitutional: Negative for activity change, appetite change, fatigue and fever.   Respiratory: Negative for apnea and shortness of breath.    Cardiovascular: Negative for chest pain and palpitations.   Gastrointestinal: Negative for abdominal distention, abdominal pain, constipation, diarrhea, nausea and vomiting.   Endocrine: Negative for cold intolerance and heat intolerance.   Genitourinary: Positive for dyspareunia and pelvic pain. Negative for difficulty urinating, frequency, menstrual problem, vaginal discharge and vaginal pain.   Neurological: Negative for headaches.   Psychiatric/Behavioral: Negative for agitation and sleep disturbance.       Objective   Physical Exam   Constitutional: She is oriented to person, place, and time. She appears well-developed.   Eyes: Right eye exhibits no discharge. Left eye exhibits no discharge.   Cardiovascular: Normal rate and regular rhythm.   Pulmonary/Chest: Effort normal and breath sounds normal.   Neurological: She is alert and oriented to person, place, and time.   Skin: Skin is warm.   Psychiatric: She has a normal mood and affect. Her behavior is normal. Judgment and thought content normal.   Vitals reviewed.      Assessment/Plan   Discussed US with pt.   US indicates unable to appreciate ovaries.     Discussed dyspareunia.   Hormone labs today.   Pt to return to discuss results.     Patient's Body mass index is 41.02 kg/m². BMI is above normal parameters. Recommendations include: " educational material.    RV discuss results/prn.   May was seen today for pelvic pain.    Diagnoses and all orders for this visit:    Pelvic pain  -     Cortisol  -     DHEA-Sulfate  -     Estradiol  -     Follicle Stimulating Hormone  -     Luteinizing Hormone  -     Progesterone  -     Testosterone  -     T4, Free  -     TSH  -     Vitamin D 25 Hydroxy    BMI 40.0-44.9, adult (CMS/[facility])    Non-smoker    Fatigue, unspecified type  -     Vitamin D 25 Hydroxy

## 2019-04-18 LAB
25(OH)D3+25(OH)D2 SERPL-MCNC: 21.5 NG/ML (ref 30–100)
CORTIS SERPL-MCNC: 4.1 UG/DL
DHEA-S SERPL-MCNC: 171.9 UG/DL (ref 84.8–378)
ESTRADIOL SERPL-MCNC: 18.3 PG/ML
FSH SERPL-ACNC: 77.4 MIU/ML
LH SERPL-ACNC: 38.8 MIU/ML
PROGEST SERPL-MCNC: 0.1 NG/ML
T4 FREE SERPL-MCNC: 1.29 NG/DL (ref 0.93–1.7)
TESTOST SERPL-MCNC: 9 NG/DL (ref 8–48)
TSH SERPL DL<=0.005 MIU/L-ACNC: 2.32 MIU/ML (ref 0.27–4.2)

## 2019-04-25 RX ORDER — ERGOCALCIFEROL 1.25 MG/1
50000 CAPSULE ORAL WEEKLY
Qty: 8 CAPSULE | Refills: 0 | Status: SHIPPED | OUTPATIENT
Start: 2019-04-25 | End: 2019-06-14

## 2019-05-08 ENCOUNTER — OFFICE VISIT (OUTPATIENT)
Dept: OBSTETRICS AND GYNECOLOGY | Facility: CLINIC | Age: 34
End: 2019-05-08

## 2019-05-08 VITALS
BODY MASS INDEX: 40.63 KG/M2 | DIASTOLIC BLOOD PRESSURE: 80 MMHG | SYSTOLIC BLOOD PRESSURE: 120 MMHG | HEIGHT: 64 IN | WEIGHT: 238 LBS

## 2019-05-08 DIAGNOSIS — Z79.890 POSTMENOPAUSAL HRT (HORMONE REPLACEMENT THERAPY): ICD-10-CM

## 2019-05-08 DIAGNOSIS — Z71.2 ENCOUNTER TO DISCUSS TEST RESULTS: Primary | ICD-10-CM

## 2019-05-08 DIAGNOSIS — R10.2 PELVIC PAIN: ICD-10-CM

## 2019-05-08 DIAGNOSIS — E55.9 VITAMIN D DEFICIENCY: ICD-10-CM

## 2019-05-08 DIAGNOSIS — R53.83 FATIGUE, UNSPECIFIED TYPE: ICD-10-CM

## 2019-05-08 DIAGNOSIS — R35.0 URINE FREQUENCY: ICD-10-CM

## 2019-05-08 DIAGNOSIS — N95.1 MENOPAUSAL SYMPTOM: ICD-10-CM

## 2019-05-08 PROCEDURE — 99213 OFFICE O/P EST LOW 20 MIN: CPT | Performed by: NURSE PRACTITIONER

## 2019-05-08 RX ORDER — ESTRADIOL 0.5 MG/1
0.5 TABLET ORAL DAILY
Qty: 30 TABLET | Refills: 2 | Status: SHIPPED | OUTPATIENT
Start: 2019-05-08 | End: 2019-06-05 | Stop reason: SDUPTHER

## 2019-05-08 NOTE — PROGRESS NOTES
"Subjective   May Lacy is a 33 y.o. female.     Discuss results.         The following portions of the patient's history were reviewed and updated as appropriate: allergies, current medications, past family history, past medical history, past social history, past surgical history and problem list.    /80 (BP Location: Left arm, Patient Position: Sitting, Cuff Size: Large Adult)   Ht 162.6 cm (64\")   Wt 108 kg (238 lb)   BMI 40.85 kg/m²     Review of Systems   Constitutional: Negative for activity change, appetite change, fatigue and fever.   Respiratory: Negative for apnea and shortness of breath.    Cardiovascular: Negative for chest pain and palpitations.   Gastrointestinal: Negative for abdominal distention, abdominal pain, constipation, diarrhea, nausea and vomiting.   Endocrine: Positive for heat intolerance. Negative for cold intolerance.        Hot flashes  Night sweats   Genitourinary: Positive for dyspareunia and pelvic pain. Negative for difficulty urinating, frequency, menstrual problem, vaginal discharge and vaginal pain.   Neurological: Negative for headaches.   Psychiatric/Behavioral: Negative for agitation and sleep disturbance.       Objective   Physical Exam   Constitutional: She is oriented to person, place, and time. She appears well-developed.   Eyes: Right eye exhibits no discharge. Left eye exhibits no discharge.   Cardiovascular: Normal rate and regular rhythm.   Pulmonary/Chest: Effort normal and breath sounds normal.   Neurological: She is alert and oriented to person, place, and time.   Skin: Skin is warm.   Psychiatric: She has a normal mood and affect. Her behavior is normal. Judgment and thought content normal.   Vitals reviewed.      Assessment/Plan    UA/culture sent r/t urine frequency.     Reviewed results from last visit.   Pt to begin vitamin D supplement.     Pt is menopausal.   Discussed options for treating symptoms including risks and benefits.   Pt opts for HRT. " Will begin low dose estradiol.   Reviewed S&S to report, pt voiced understanding.   Patient's Body mass index is 40.85 kg/m². BMI is above normal parameters. Recommendations include: educational material.    RV 1 month/prn.   May was seen today for results.    Diagnoses and all orders for this visit:    Encounter to discuss test results    Pelvic pain    Fatigue, unspecified type    Vitamin D deficiency    Urine frequency  -     Urinalysis With Culture If Indicated - Urine, Clean Catch    Menopausal symptom  -     Mammo Screening Digital Tomosynthesis Bilateral With CAD; Future    Postmenopausal HRT (hormone replacement therapy)  -     Mammo Screening Digital Tomosynthesis Bilateral With CAD; Future    Other orders  -     estradiol (ESTRACE) 0.5 MG tablet; Take 1 tablet by mouth Daily.  -     Microscopic Examination -

## 2019-05-09 LAB
APPEARANCE UR: CLEAR
BACTERIA #/AREA URNS HPF: NORMAL /HPF
BILIRUB UR QL STRIP: NEGATIVE
COLOR UR: YELLOW
EPI CELLS #/AREA URNS HPF: NORMAL /HPF
GLUCOSE UR QL: NEGATIVE
HGB UR QL STRIP: NEGATIVE
KETONES UR QL STRIP: NEGATIVE
LEUKOCYTE ESTERASE UR QL STRIP: NEGATIVE
MICRO URNS: NORMAL
MICRO URNS: NORMAL
MUCOUS THREADS URNS QL MICRO: PRESENT /HPF
NITRITE UR QL STRIP: NEGATIVE
PH UR STRIP: 5.5 [PH] (ref 5–7.5)
PROT UR QL STRIP: NEGATIVE
RBC #/AREA URNS HPF: NORMAL /HPF
SP GR UR: 1.02 (ref 1–1.03)
URINALYSIS REFLEX: NORMAL
UROBILINOGEN UR STRIP-MCNC: 0.2 MG/DL (ref 0.2–1)
WBC #/AREA URNS HPF: NORMAL /HPF

## 2019-05-17 ENCOUNTER — HOSPITAL ENCOUNTER (OUTPATIENT)
Dept: MAMMOGRAPHY | Facility: HOSPITAL | Age: 34
Discharge: HOME | End: 2019-05-17
Admitting: NURSE PRACTITIONER

## 2019-05-17 DIAGNOSIS — Z79.890 POSTMENOPAUSAL HRT (HORMONE REPLACEMENT THERAPY): ICD-10-CM

## 2019-05-17 DIAGNOSIS — N95.1 MENOPAUSAL SYMPTOM: ICD-10-CM

## 2019-05-17 PROCEDURE — 77067 SCR MAMMO BI INCL CAD: CPT

## 2019-05-17 PROCEDURE — 77063 BREAST TOMOSYNTHESIS BI: CPT

## 2019-05-19 NOTE — PATIENT INSTRUCTIONS

## 2019-06-05 ENCOUNTER — OFFICE VISIT (OUTPATIENT)
Dept: OBSTETRICS AND GYNECOLOGY | Facility: CLINIC | Age: 34
End: 2019-06-05

## 2019-06-05 VITALS
DIASTOLIC BLOOD PRESSURE: 82 MMHG | HEIGHT: 64 IN | BODY MASS INDEX: 40.97 KG/M2 | WEIGHT: 240 LBS | SYSTOLIC BLOOD PRESSURE: 128 MMHG

## 2019-06-05 DIAGNOSIS — N95.1 MENOPAUSAL SYMPTOM: ICD-10-CM

## 2019-06-05 DIAGNOSIS — Z78.9 NON-SMOKER: ICD-10-CM

## 2019-06-05 DIAGNOSIS — E55.9 VITAMIN D DEFICIENCY: ICD-10-CM

## 2019-06-05 DIAGNOSIS — Z79.890 POSTMENOPAUSAL HRT (HORMONE REPLACEMENT THERAPY): Primary | ICD-10-CM

## 2019-06-05 PROCEDURE — 99213 OFFICE O/P EST LOW 20 MIN: CPT | Performed by: NURSE PRACTITIONER

## 2019-06-05 RX ORDER — ACETAMINOPHEN 160 MG
2000 TABLET,DISINTEGRATING ORAL DAILY
COMMUNITY
End: 2022-06-24

## 2019-06-05 RX ORDER — ESTRADIOL 0.5 MG/1
0.5 TABLET ORAL DAILY
Qty: 30 TABLET | Refills: 2 | Status: SHIPPED | OUTPATIENT
Start: 2019-06-05 | End: 2022-06-24

## 2019-06-07 ENCOUNTER — HOSPITAL ENCOUNTER (EMERGENCY)
Facility: HOSPITAL | Age: 34
Discharge: HOME | End: 2019-06-07
Admitting: EMERGENCY MEDICINE

## 2019-06-07 VITALS
WEIGHT: 235 LBS | OXYGEN SATURATION: 97 % | SYSTOLIC BLOOD PRESSURE: 139 MMHG | BODY MASS INDEX: 40.12 KG/M2 | RESPIRATION RATE: 17 BRPM | TEMPERATURE: 98.7 F | DIASTOLIC BLOOD PRESSURE: 82 MMHG | HEART RATE: 104 BPM | HEIGHT: 64 IN

## 2019-06-07 DIAGNOSIS — J03.90 TONSILLITIS: Primary | ICD-10-CM

## 2019-06-07 LAB — S PYO AG THROAT QL: POSITIVE

## 2019-06-07 PROCEDURE — 25010000002 CEFTRIAXONE PER 250 MG: Performed by: NURSE PRACTITIONER

## 2019-06-07 PROCEDURE — 25010000002 DEXAMETHASONE PER 1 MG: Performed by: NURSE PRACTITIONER

## 2019-06-07 PROCEDURE — 25010000002 KETOROLAC TROMETHAMINE PER 15 MG: Performed by: NURSE PRACTITIONER

## 2019-06-07 PROCEDURE — 99283 EMERGENCY DEPT VISIT LOW MDM: CPT

## 2019-06-07 PROCEDURE — 25010000002 ONDANSETRON PER 1 MG: Performed by: NURSE PRACTITIONER

## 2019-06-07 PROCEDURE — 96372 THER/PROPH/DIAG INJ SC/IM: CPT

## 2019-06-07 PROCEDURE — 87880 STREP A ASSAY W/OPTIC: CPT | Performed by: PHYSICIAN ASSISTANT

## 2019-06-07 RX ORDER — DEXAMETHASONE SODIUM PHOSPHATE 10 MG/ML
8 INJECTION INTRAMUSCULAR; INTRAVENOUS ONCE
Status: COMPLETED | OUTPATIENT
Start: 2019-06-07 | End: 2019-06-07

## 2019-06-07 RX ORDER — KETOROLAC TROMETHAMINE 30 MG/ML
60 INJECTION, SOLUTION INTRAMUSCULAR; INTRAVENOUS ONCE
Status: COMPLETED | OUTPATIENT
Start: 2019-06-07 | End: 2019-06-07

## 2019-06-07 RX ORDER — METHYLPREDNISOLONE 4 MG/1
TABLET ORAL
Qty: 21 EACH | Refills: 0 | Status: SHIPPED | OUTPATIENT
Start: 2019-06-07 | End: 2019-08-23

## 2019-06-07 RX ORDER — AMOXICILLIN 500 MG/1
500 CAPSULE ORAL 2 TIMES DAILY
Qty: 20 CAPSULE | Refills: 0 | Status: SHIPPED | OUTPATIENT
Start: 2019-06-07 | End: 2019-06-17

## 2019-06-07 RX ORDER — ONDANSETRON 2 MG/ML
4 INJECTION INTRAMUSCULAR; INTRAVENOUS ONCE
Status: COMPLETED | OUTPATIENT
Start: 2019-06-07 | End: 2019-06-07

## 2019-06-07 RX ORDER — ONDANSETRON 4 MG/1
4 TABLET, ORALLY DISINTEGRATING ORAL EVERY 6 HOURS PRN
Qty: 10 TABLET | Refills: 0 | Status: SHIPPED | OUTPATIENT
Start: 2019-06-07 | End: 2019-08-23

## 2019-06-07 RX ADMIN — ONDANSETRON 4 MG: 2 INJECTION INTRAMUSCULAR; INTRAVENOUS at 17:25

## 2019-06-07 RX ADMIN — LIDOCAINE HYDROCHLORIDE 1 G: 10 INJECTION, SOLUTION INFILTRATION; PERINEURAL at 17:24

## 2019-06-07 RX ADMIN — DEXAMETHASONE SODIUM PHOSPHATE 8 MG: 10 INJECTION INTRAMUSCULAR; INTRAVENOUS at 17:25

## 2019-06-07 RX ADMIN — KETOROLAC TROMETHAMINE 60 MG: 60 INJECTION, SOLUTION INTRAMUSCULAR at 17:25

## 2019-06-07 NOTE — DISCHARGE INSTRUCTIONS
Push fluids; maintain fever control; get popsicles, pudding, etc that will provide comfort/soothing measures to the throat

## 2019-06-07 NOTE — ED PROVIDER NOTES
Subjective     Illness   Severity:  Moderate  Duration:  1 day  Chronicity:  New  Context:  Sore throat; pain with swallowing, reports one episode of vomiting after eating  Associated symptoms: nausea, sore throat and vomiting    Associated symptoms: no abdominal pain, no congestion, no cough, no fever, no myalgias and no rash        Review of Systems   Constitutional: Negative for fever.   HENT: Positive for sore throat. Negative for congestion and drooling.    Respiratory: Negative for cough.    Gastrointestinal: Positive for nausea and vomiting. Negative for abdominal pain.   Musculoskeletal: Negative for joint swelling, myalgias, neck pain and neck stiffness.   Skin: Negative for color change, pallor, rash and wound.       Past Medical History:   Diagnosis Date   • Cat scratch fever    • Hypertension    • Precancerous changes of the cervix        No Known Allergies    Past Surgical History:   Procedure Laterality Date   •  SECTION     • HYSTERECTOMY     • NECK SURGERY     • OOPHORECTOMY         Family History   Problem Relation Age of Onset   • BRCA 1/2 Neg Hx    • Breast cancer Neg Hx    • Colon cancer Neg Hx    • Endometrial cancer Neg Hx    • Ovarian cancer Neg Hx    • Uterine cancer Neg Hx        Social History     Socioeconomic History   • Marital status:      Spouse name: Not on file   • Number of children: Not on file   • Years of education: Not on file   • Highest education level: Not on file   Tobacco Use   • Smoking status: Never Smoker   • Smokeless tobacco: Never Used   Substance and Sexual Activity   • Alcohol use: No   • Drug use: No   • Sexual activity: Yes     Birth control/protection: Surgical           Objective   Physical Exam   Constitutional: She is oriented to person, place, and time. She appears well-developed and well-nourished.   HENT:   Head: Normocephalic and atraumatic.   Right Ear: Hearing and tympanic membrane normal.   Left Ear: Hearing and tympanic membrane normal.    Nose: Nose normal.   Mouth/Throat: Mucous membranes are normal. No oral lesions. Oropharyngeal exudate and posterior oropharyngeal erythema present. No tonsillar abscesses. Tonsils are 1+ on the right. Tonsils are 1+ on the left.   Eyes: Conjunctivae and EOM are normal. Pupils are equal, round, and reactive to light.   Neck: Normal range of motion. Neck supple.   Cardiovascular: Normal rate, regular rhythm, normal heart sounds and intact distal pulses.   Pulmonary/Chest: Effort normal and breath sounds normal.   Abdominal: Soft. Bowel sounds are normal.   Musculoskeletal: Normal range of motion.   Neurological: She is alert and oriented to person, place, and time.   Skin: Skin is warm and dry. Capillary refill takes less than 2 seconds.   Psychiatric: She has a normal mood and affect. Her behavior is normal.   Nursing note and vitals reviewed.      Procedures           ED Course                  MDM  Number of Diagnoses or Management Options  Tonsillitis: new and does not require workup     Amount and/or Complexity of Data Reviewed  Discuss the patient with other providers: yes    Risk of Complications, Morbidity, and/or Mortality  Presenting problems: low  Diagnostic procedures: low  Management options: low    Patient Progress  Patient progress: improved        Final diagnoses:   Tonsillitis            Sara Moreno, APRN  06/07/19 1934

## 2019-06-12 NOTE — PATIENT INSTRUCTIONS

## 2019-07-12 ENCOUNTER — TRANSCRIBE ORDERS (OUTPATIENT)
Dept: ADMINISTRATIVE | Facility: HOSPITAL | Age: 34
End: 2019-07-12

## 2019-07-12 DIAGNOSIS — R10.11 RUQ PAIN: Primary | ICD-10-CM

## 2019-07-16 ENCOUNTER — HOSPITAL ENCOUNTER (OUTPATIENT)
Dept: ULTRASOUND IMAGING | Facility: HOSPITAL | Age: 34
Discharge: HOME | End: 2019-07-16
Admitting: NURSE PRACTITIONER

## 2019-07-16 PROCEDURE — 76705 ECHO EXAM OF ABDOMEN: CPT

## 2019-07-17 ENCOUNTER — TRANSCRIBE ORDERS (OUTPATIENT)
Dept: ADMINISTRATIVE | Facility: HOSPITAL | Age: 34
End: 2019-07-17

## 2019-07-17 DIAGNOSIS — R10.11 RIGHT UPPER QUADRANT PAIN: Primary | ICD-10-CM

## 2019-07-22 ENCOUNTER — APPOINTMENT (OUTPATIENT)
Dept: NUCLEAR MEDICINE | Facility: HOSPITAL | Age: 34
End: 2019-07-22

## 2019-07-22 ENCOUNTER — HOSPITAL ENCOUNTER (OUTPATIENT)
Dept: NUCLEAR MEDICINE | Facility: HOSPITAL | Age: 34
Discharge: HOME | End: 2019-07-22

## 2019-07-22 DIAGNOSIS — R10.11 RIGHT UPPER QUADRANT PAIN: ICD-10-CM

## 2019-07-22 PROCEDURE — 0 TECHNETIUM TC 99M MEBROFENIN KIT: Performed by: NURSE PRACTITIONER

## 2019-07-22 PROCEDURE — A9537 TC99M MEBROFENIN: HCPCS | Performed by: NURSE PRACTITIONER

## 2019-07-22 PROCEDURE — 78226 HEPATOBILIARY SYSTEM IMAGING: CPT

## 2019-07-22 RX ORDER — KIT FOR THE PREPARATION OF TECHNETIUM TC 99M MEBROFENIN 45 MG/10ML
1 INJECTION, POWDER, LYOPHILIZED, FOR SOLUTION INTRAVENOUS
Status: COMPLETED | OUTPATIENT
Start: 2019-07-22 | End: 2019-07-22

## 2019-07-22 RX ADMIN — MEBROFENIN 1 DOSE: 45 INJECTION, POWDER, LYOPHILIZED, FOR SOLUTION INTRAVENOUS at 10:41

## 2019-08-23 ENCOUNTER — APPOINTMENT (OUTPATIENT)
Dept: PREADMISSION TESTING | Facility: HOSPITAL | Age: 34
End: 2019-08-23

## 2019-08-23 VITALS
HEART RATE: 74 BPM | HEIGHT: 65 IN | WEIGHT: 237.66 LBS | DIASTOLIC BLOOD PRESSURE: 99 MMHG | BODY MASS INDEX: 39.6 KG/M2 | SYSTOLIC BLOOD PRESSURE: 144 MMHG | RESPIRATION RATE: 18 BRPM | OXYGEN SATURATION: 96 %

## 2019-08-23 LAB
ALBUMIN SERPL-MCNC: 4.2 G/DL (ref 3.5–5)
ALBUMIN/GLOB SERPL: 1.2 G/DL (ref 1.1–2.5)
ALP SERPL-CCNC: 88 U/L (ref 24–120)
ALT SERPL W P-5'-P-CCNC: 23 U/L (ref 0–54)
ANION GAP SERPL CALCULATED.3IONS-SCNC: 5 MMOL/L (ref 4–13)
AST SERPL-CCNC: 26 U/L (ref 7–45)
BASOPHILS # BLD AUTO: 0.03 10*3/MM3 (ref 0–0.2)
BASOPHILS NFR BLD AUTO: 0.6 % (ref 0–1.5)
BILIRUB SERPL-MCNC: 0.5 MG/DL (ref 0.1–1)
BUN BLD-MCNC: 10 MG/DL (ref 5–21)
BUN/CREAT SERPL: 16.1 (ref 7–25)
CALCIUM SPEC-SCNC: 9.4 MG/DL (ref 8.4–10.4)
CHLORIDE SERPL-SCNC: 105 MMOL/L (ref 98–110)
CO2 SERPL-SCNC: 28 MMOL/L (ref 24–31)
CREAT BLD-MCNC: 0.62 MG/DL (ref 0.5–1.4)
DEPRECATED RDW RBC AUTO: 37.6 FL (ref 37–54)
EOSINOPHIL # BLD AUTO: 0.08 10*3/MM3 (ref 0–0.4)
EOSINOPHIL NFR BLD AUTO: 1.5 % (ref 0.3–6.2)
ERYTHROCYTE [DISTWIDTH] IN BLOOD BY AUTOMATED COUNT: 11.9 % (ref 12.3–15.4)
GFR SERPL CREATININE-BSD FRML MDRD: 112 ML/MIN/1.73
GLOBULIN UR ELPH-MCNC: 3.5 GM/DL
GLUCOSE BLD-MCNC: 92 MG/DL (ref 70–100)
HCT VFR BLD AUTO: 40.3 % (ref 34–46.6)
HGB BLD-MCNC: 13.8 G/DL (ref 12–15.9)
IMM GRANULOCYTES # BLD AUTO: 0.02 10*3/MM3 (ref 0–0.05)
IMM GRANULOCYTES NFR BLD AUTO: 0.4 % (ref 0–0.5)
LYMPHOCYTES # BLD AUTO: 1.98 10*3/MM3 (ref 0.7–3.1)
LYMPHOCYTES NFR BLD AUTO: 37 % (ref 19.6–45.3)
MCH RBC QN AUTO: 29.7 PG (ref 26.6–33)
MCHC RBC AUTO-ENTMCNC: 34.2 G/DL (ref 31.5–35.7)
MCV RBC AUTO: 86.7 FL (ref 79–97)
MONOCYTES # BLD AUTO: 0.34 10*3/MM3 (ref 0.1–0.9)
MONOCYTES NFR BLD AUTO: 6.4 % (ref 5–12)
NEUTROPHILS # BLD AUTO: 2.9 10*3/MM3 (ref 1.7–7)
NEUTROPHILS NFR BLD AUTO: 54.1 % (ref 42.7–76)
NRBC BLD AUTO-RTO: 0 /100 WBC (ref 0–0.2)
PLATELET # BLD AUTO: 273 10*3/MM3 (ref 140–450)
PMV BLD AUTO: 10.6 FL (ref 6–12)
POTASSIUM BLD-SCNC: 4.1 MMOL/L (ref 3.5–5.3)
PROT SERPL-MCNC: 7.7 G/DL (ref 6.3–8.7)
RBC # BLD AUTO: 4.65 10*6/MM3 (ref 3.77–5.28)
SODIUM BLD-SCNC: 138 MMOL/L (ref 135–145)
WBC NRBC COR # BLD: 5.35 10*3/MM3 (ref 3.4–10.8)

## 2019-08-23 PROCEDURE — 85025 COMPLETE CBC W/AUTO DIFF WBC: CPT | Performed by: SPECIALIST

## 2019-08-23 PROCEDURE — 80053 COMPREHEN METABOLIC PANEL: CPT | Performed by: SPECIALIST

## 2019-08-23 PROCEDURE — 93010 ELECTROCARDIOGRAM REPORT: CPT | Performed by: INTERNAL MEDICINE

## 2019-08-23 PROCEDURE — 36415 COLL VENOUS BLD VENIPUNCTURE: CPT

## 2019-08-23 PROCEDURE — 93005 ELECTROCARDIOGRAM TRACING: CPT

## 2019-08-23 RX ORDER — IBUPROFEN 800 MG/1
800 TABLET ORAL EVERY 12 HOURS
COMMUNITY
End: 2022-06-24

## 2019-08-23 RX ORDER — CETIRIZINE HYDROCHLORIDE 10 MG/1
10 TABLET ORAL DAILY
COMMUNITY
End: 2022-06-24

## 2019-08-23 NOTE — DISCHARGE INSTRUCTIONS
DAY OF SURGERY INSTRUCTIONS        YOUR SURGEON: DR Desire YAN    PROCEDURE: ***LAPAROSCOPIC CHOLECYSTECTOMY    DATE OF SURGERY: ***8/30/2019    ARRIVAL TIME: AS DIRECTED BY OFFICE    YOU MAY TAKE THE FOLLOWING MEDICATION(S) THE MORNING OF SURGERY WITH A SIP OF WATER: ***NO MEDICATIONS NEEDED PER ANESTHESIA      ALL OTHER HOME MEDICATION CHECK WITH YOUR PHYSICIAN                MANAGING PAIN AFTER SURGERY    We know you are probably wondering what your pain will be like after surgery.  Following surgery it is unrealistic to expect you will not have pain.   Pain is how our bodies let us know that something is wrong or cautions us to be careful.  That said, our goal is to make your pain tolerable.    Methods we may use to treat your pain include (oral or IV medications, PCAs, epidurals, nerve blocks, etc.)   While some procedures require IV pain medications for a short time after surgery, transitioning to pain medications by mouth allows for better management of pain.   Your nurse will encourage you to take oral pain medications whenever possible.  IV medications work almost immediately, but only last a short while.  Taking medications by mouth allows for a more constant level of medication in your blood stream for a longer period of time.      Once your pain is out of control it is harder to get back under control.  It is important you are aware when your next dose of pain medication is due.  If you are admitted, your nurse may write the time of your next dose on the white board in your room to help you remember.      We are interested in your pain and encourage you to inform us about aggravating factors during your visit.   Many times a simple repositioning every few hours can make a big difference.    If your physician says it is okay, do not let your pain prevent you from getting out of bed. Be sure to call your nurse for assistance prior to getting up so you do not fall.      Before surgery, please decide  your tolerable pain goal.  These faces help describe the pain ratings we use on a 0-10 scale.   Be prepared to tell us your goal and whether or not you take pain or anxiety medications at home.          BEFORE YOU COME TO THE HOSPITAL  (Pre-op instructions)  • Do not eat, drink, smoke or chew gum after midnight the night before surgery.  This also includes no mints.  • Morning of surgery take only the medicines you have been instructed with a sip of water unless otherwise instructed  by your physician.  • Do not shave, wear makeup or dark nail polish.  • Remove all jewelry including rings.  • Leave anything you consider valuable at home.  • Leave your suitcase in the car until after your surgery.  • Bring the following with you if applicable:  o Picture ID and insurance, Medicare or Medicaid cards  o Co-pay/deductible required by insurance (cash, check, credit card)  o Copy of advance directive, living will or power-of- documents if not brought to PAT  o CPAP or BIPAP mask and tubing  o Relaxation aids (MP3 player, book, magazine)  • On the day of surgery check in at registration located at the main entrance of the hospital.       Outpatient Surgery Guidelines, Adult  Outpatient procedures are those for which the person having the procedure is allowed to go home the same day as the procedure. Various procedures are done on an outpatient basis. You should follow some general guidelines if you will be having an outpatient procedure.  LET YOUR HEALTH CARE PROVIDER KNOW ABOUT:  · Any allergies you have.  · All medicines you are taking, including vitamins, herbs, eye drops, creams, and over-the-counter medicines.  · Previous problems you or members of your family have had with the use of anesthetics.  · Any blood disorders you have.  · Previous surgeries you have had.  · Medical conditions you have.  RISKS AND COMPLICATIONS  Your health care provider will discuss possible risks and complications with you before  surgery. Common risks and complications include:    · Problems due to the use of anesthetics.  · Blood loss and replacement (does not apply to minor surgical procedures).  · Temporary increase in pain due to surgery.  · Uncorrected pain or problems that the surgery was meant to correct.  · Infection.  · New damage.  BEFORE THE PROCEDURE  · Ask your health care provider about changing or stopping your regular medicines. You may need to stop taking certain medicines in the days or weeks before the procedure.  · Stop smoking at least 2 weeks before surgery. This lowers your risk for complications during and after surgery. Ask your health care provider for help with this if needed.  · Eat your usual meals and a light supper the day before surgery. Continue fluid intake. Do not drink alcohol.  · Do not eat or drink after midnight the night before your surgery.   · Arrange for someone to take you home and to stay with you for 24 hours after the procedure. Medicine given for your procedure may affect your ability to drive or to care for yourself.  · Call your health care provider's office if you develop an illness or problem that may prevent you from safely having your procedure.  AFTER THE PROCEDURE  After surgery, you will be taken to a recovery area, where your progress will be monitored. If there are no complications, you will be allowed to go home when you are awake, stable, and taking fluids well. You may have numbness around the surgical site. Healing will take some time. You will have tenderness at the surgical site and may have some swelling and bruising. You may also have some nausea.  HOME CARE INSTRUCTIONS  · Do not drive for 24 hours, or as directed by your health care provider. Do not drive while taking prescription pain medicines.  · Do not drink alcohol for 24 hours.  · Do not make important decisions or sign legal documents for 24 hours.  · You may resume a normal diet and activities as directed.  · Do not  lift anything heavier than 10 pounds (4.5 kg) or play contact sports until your health care provider says it is okay.  · Change your bandages (dressings) as directed.  · Only take over-the-counter or prescription medicines as directed by your health care provider.  · Follow up with your health care provider as directed.  SEEK MEDICAL CARE IF:  · You have increased bleeding (more than a small spot) from the surgical site.  · You have redness, swelling, or increasing pain in the wound.  · You see pus coming from the wound.  · You have a fever.  · You notice a bad smell coming from the wound or dressing.  · You feel lightheaded or faint.  · You develop a rash.  · You have trouble breathing.  · You develop allergies.  MAKE SURE YOU:  · Understand these instructions.  · Will watch your condition.  · Will get help right away if you are not doing well or get worse.     This information is not intended to replace advice given to you by your health care provider. Make sure you discuss any questions you have with your health care provider.     Document Released: 09/12/2002 Document Revised: 05/03/2016 Document Reviewed: 05/22/2014  [employer] Interactive Patient Education ©2016 [employer]       Fall Prevention in Hospitals, Adult  As a hospital patient, your condition and the treatments you receive can increase your risk for falls. Some additional risk factors for falls in a hospital include:  · Being in an unfamiliar environment.  · Being on bed rest.  · Your surgery.  · Taking certain medicines.  · Your tubing requirements, such as intravenous (IV) therapy or catheters.  It is important that you learn how to decrease fall risks while at the hospital. Below are important tips that can help prevent falls.  SAFETY TIPS FOR PREVENTING FALLS  Talk about your risk of falling.  · Ask your health care provider why you are at risk for falling. Is it your medicine, illness, tubing placement, or something else?  · Make a plan with  your health care provider to keep you safe from falls.  · Ask your health care provider or pharmacist about side effects of your medicines. Some medicines can make you dizzy or affect your coordination.  Ask for help.  · Ask for help before getting out of bed. You may need to press your call button.  · Ask for assistance in getting safely to the toilet.  · Ask for a walker or cane to be put at your bedside. Ask that most of the side rails on your bed be placed up before your health care provider leaves the room.  · Ask family or friends to sit with you.  · Ask for things that are out of your reach, such as your glasses, hearing aids, telephone, bedside table, or call button.  Follow these tips to avoid falling:  · Stay lying or seated, rather than standing, while waiting for help.  · Wear rubber-soled slippers or shoes whenever you walk in the hospital.  · Avoid quick, sudden movements.  ¨ Change positions slowly.  ¨ Sit on the side of your bed before standing.  ¨ Stand up slowly and wait before you start to walk.  · Let your health care provider know if there is a spill on the floor.  · Pay careful attention to the medical equipment, electrical cords, and tubes around you.  · When you need help, use your call button by your bed or in the bathroom. Wait for one of your health care providers to help you.  · If you feel dizzy or unsure of your footing, return to bed and wait for assistance.  · Avoid being distracted by the TV, telephone, or another person in your room.  · Do not lean or support yourself on rolling objects, such as IV poles or bedside tables.     This information is not intended to replace advice given to you by your health care provider. Make sure you discuss any questions you have with your health care provider.     Document Released: 12/15/2001 Document Revised: 01/08/2016 Document Reviewed: 08/25/2013  [employer] Interactive Patient Education ©2016 [employer]       Surgical Site Infections  FAQs  What is a Surgical Site Infection (SSI)?  A surgical site infection is an infection that occurs after surgery in the part of the body where the surgery took place. Most patients who have surgery do not develop an infection. However, infections develop in about 1 to 3 out of every 100 patients who have surgery.  Some of the common symptoms of a surgical site infection are:  · Redness and pain around the area where you had surgery  · Drainage of cloudy fluid from your surgical wound  · Fever  Can SSIs be treated?  Yes. Most surgical site infections can be treated with antibiotics. The antibiotic given to you depends on the bacteria (germs) causing the infection. Sometimes patients with SSIs also need another surgery to treat the infection.  What are some of the things that hospitals are doing to prevent SSIs?  To prevent SSIs, doctors, nurses, and other healthcare providers:  · Clean their hands and arms up to their elbows with an antiseptic agent just before the surgery.  · Clean their hands with soap and water or an alcohol-based hand rub before and after caring for each patient.  · May remove some of your hair immediately before your surgery using electric clippers if the hair is in the same area where the procedure will occur. They should not shave you with a razor.  · Wear special hair covers, masks, gowns, and gloves during surgery to keep the surgery area clean.  · Give you antibiotics before your surgery starts. In most cases, you should get antibiotics within 60 minutes before the surgery starts and the antibiotics should be stopped within 24 hours after surgery.  · Clean the skin at the site of your surgery with a special soap that kills germs.  What can I do to help prevent SSIs?  Before your surgery:  · Tell your doctor about other medical problems you may have. Health problems such as allergies, diabetes, and obesity could affect your surgery and your treatment.  · Quit smoking. Patients who smoke  get more infections. Talk to your doctor about how you can quit before your surgery.  · Do not shave near where you will have surgery. Shaving with a razor can irritate your skin and make it easier to develop an infection.  At the time of your surgery:  · Speak up if someone tries to shave you with a razor before surgery. Ask why you need to be shaved and talk with your surgeon if you have any concerns.  · Ask if you will get antibiotics before surgery.  After your surgery:  · Make sure that your healthcare providers clean their hands before examining you, either with soap and water or an alcohol-based hand rub.  · If you do not see your providers clean their hands, please ask them to do so.  · Family and friends who visit you should not touch the surgical wound or dressings.  · Family and friends should clean their hands with soap and water or an alcohol-based hand rub before and after visiting you. If you do not see them clean their hands, ask them to clean their hands.  What do I need to do when I go home from the hospital?  · Before you go home, your doctor or nurse should explain everything you need to know about taking care of your wound. Make sure you understand how to care for your wound before you leave the hospital.  · Always clean your hands before and after caring for your wound.  · Before you go home, make sure you know who to contact if you have questions or problems after you get home.  · If you have any symptoms of an infection, such as redness and pain at the surgery site, drainage, or fever, call your doctor immediately.  If you have additional questions, please ask your doctor or nurse.  Developed and co-sponsored by The Society for Healthcare Epidemiology of [place] (SHEA); Infectious Diseases Society of [place] (IDSA); American Hospital Association; Association for Professionals in Infection Control and Epidemiology (APIC); Centers for Disease Control and Prevention (CDC); and The Joint  Commission.     This information is not intended to replace advice given to you by your health care provider. Make sure you discuss any questions you have with your health care provider.     Document Released: 12/23/2014 Document Revised: 01/08/2016 Document Reviewed: 03/02/2016  [employer] Interactive Patient Education ©2016 [employer]       [facility]  CHG 4% Patient Instruction Sheet    Preparing the Skin Before Surgery  Preparing or “prepping” skin before surgery can reduce the risk of infection at the surgical site. To make the process easier,[facility] has chosen 4% Chlorhexidine Gluconate (CHG) antiseptic solution.   The steps below outline the prepping process and should be carefully followed.                                                                                                                                                      Prep the skin at the following time(s):                                                      We recommend you shower the night before surgery, and again the morning of surgery with the 4% CHG antiseptic solution using half of the bottle and a cloth each time.  Dress in clean clothes/sleepwear after showering.  See instructions below for application.          Do not apply any lotions or moisturizers.       Do not shave the area to be prepped for at least 2 days prior to surgery.    Clipping the hair may be done immediately prior to your surgery at the hospital    if needed.    Directions:  Thoroughly rinse your body with water.  Apply 4% CHG to a cloth and wash skin gently, paying special attention to the operative site.  Rinse again thoroughly.  Once you have begun using this product do not apply anything else to your skin. If itching or redness persists, rinse affected areas and discontinue use.    When using this product:  • Keep out of eyes, ears, and mouth.  • If solution should contact these areas, rinse out promptly and thoroughly with water.  • For external  use only.  • Do not use in genital area, on your face or head.      PATIENT/FAMILY/RESPONSIBLE PARTY VERBALIZES UNDERSTANDING OF ABOVE EDUCATION.  COPY OF PAIN SCALE GIVEN AND REVIEWED WITH VERBALIZED UNDERSTANDING.

## 2019-08-30 ENCOUNTER — ANESTHESIA EVENT (OUTPATIENT)
Dept: PERIOP | Facility: HOSPITAL | Age: 34
End: 2019-08-30

## 2019-08-30 ENCOUNTER — ANESTHESIA (OUTPATIENT)
Dept: PERIOP | Facility: HOSPITAL | Age: 34
End: 2019-08-30

## 2019-08-30 ENCOUNTER — HOSPITAL ENCOUNTER (OUTPATIENT)
Facility: HOSPITAL | Age: 34
Setting detail: HOSPITAL OUTPATIENT SURGERY
Discharge: HOME | End: 2019-08-30
Attending: SPECIALIST | Admitting: SPECIALIST

## 2019-08-30 VITALS
SYSTOLIC BLOOD PRESSURE: 126 MMHG | TEMPERATURE: 97.2 F | DIASTOLIC BLOOD PRESSURE: 66 MMHG | OXYGEN SATURATION: 93 % | HEART RATE: 64 BPM | RESPIRATION RATE: 16 BRPM

## 2019-08-30 DIAGNOSIS — K82.8 BILIARY DYSKINESIA: ICD-10-CM

## 2019-08-30 PROCEDURE — 25010000002 ONDANSETRON PER 1 MG: Performed by: NURSE ANESTHETIST, CERTIFIED REGISTERED

## 2019-08-30 PROCEDURE — 25010000002 MIDAZOLAM PER 1 MG: Performed by: ANESTHESIOLOGY

## 2019-08-30 PROCEDURE — 25010000002 FENTANYL CITRATE (PF) 100 MCG/2ML SOLUTION: Performed by: ANESTHESIOLOGY

## 2019-08-30 PROCEDURE — 25010000002 NEOSTIGMINE PER 0.5 MG: Performed by: NURSE ANESTHETIST, CERTIFIED REGISTERED

## 2019-08-30 PROCEDURE — 25010000002 HYDROMORPHONE PER 4 MG: Performed by: NURSE ANESTHETIST, CERTIFIED REGISTERED

## 2019-08-30 PROCEDURE — 25010000002 KETOROLAC TROMETHAMINE PER 15 MG: Performed by: NURSE ANESTHETIST, CERTIFIED REGISTERED

## 2019-08-30 PROCEDURE — 88304 TISSUE EXAM BY PATHOLOGIST: CPT | Performed by: SPECIALIST

## 2019-08-30 PROCEDURE — 25010000002 FENTANYL CITRATE (PF) 250 MCG/5ML SOLUTION: Performed by: NURSE ANESTHETIST, CERTIFIED REGISTERED

## 2019-08-30 PROCEDURE — 25010000002 PROPOFOL 10 MG/ML EMULSION: Performed by: NURSE ANESTHETIST, CERTIFIED REGISTERED

## 2019-08-30 RX ORDER — ONDANSETRON 2 MG/ML
INJECTION INTRAMUSCULAR; INTRAVENOUS AS NEEDED
Status: DISCONTINUED | OUTPATIENT
Start: 2019-08-30 | End: 2019-08-30 | Stop reason: SURG

## 2019-08-30 RX ORDER — HYDROCODONE BITARTRATE AND ACETAMINOPHEN 5; 325 MG/1; MG/1
1-2 TABLET ORAL EVERY 4 HOURS PRN
Qty: 30 TABLET | Refills: 0 | Status: SHIPPED | OUTPATIENT
Start: 2019-08-30 | End: 2022-06-24

## 2019-08-30 RX ORDER — LIDOCAINE HYDROCHLORIDE 20 MG/ML
INJECTION, SOLUTION INFILTRATION; PERINEURAL AS NEEDED
Status: DISCONTINUED | OUTPATIENT
Start: 2019-08-30 | End: 2019-08-30 | Stop reason: SURG

## 2019-08-30 RX ORDER — PROPOFOL 10 MG/ML
VIAL (ML) INTRAVENOUS AS NEEDED
Status: DISCONTINUED | OUTPATIENT
Start: 2019-08-30 | End: 2019-08-30 | Stop reason: SURG

## 2019-08-30 RX ORDER — LABETALOL HYDROCHLORIDE 5 MG/ML
5 INJECTION, SOLUTION INTRAVENOUS
Status: DISCONTINUED | OUTPATIENT
Start: 2019-08-30 | End: 2019-08-30 | Stop reason: HOSPADM

## 2019-08-30 RX ORDER — ONDANSETRON 2 MG/ML
4 INJECTION INTRAMUSCULAR; INTRAVENOUS ONCE AS NEEDED
Status: DISCONTINUED | OUTPATIENT
Start: 2019-08-30 | End: 2019-08-30 | Stop reason: HOSPADM

## 2019-08-30 RX ORDER — IPRATROPIUM BROMIDE AND ALBUTEROL SULFATE 2.5; .5 MG/3ML; MG/3ML
3 SOLUTION RESPIRATORY (INHALATION) ONCE
Status: COMPLETED | OUTPATIENT
Start: 2019-08-30 | End: 2019-08-30

## 2019-08-30 RX ORDER — MIDAZOLAM HYDROCHLORIDE 1 MG/ML
1 INJECTION INTRAMUSCULAR; INTRAVENOUS
Status: DISCONTINUED | OUTPATIENT
Start: 2019-08-30 | End: 2019-08-30 | Stop reason: HOSPADM

## 2019-08-30 RX ORDER — FENTANYL CITRATE 50 UG/ML
INJECTION, SOLUTION INTRAMUSCULAR; INTRAVENOUS AS NEEDED
Status: DISCONTINUED | OUTPATIENT
Start: 2019-08-30 | End: 2019-08-30 | Stop reason: SURG

## 2019-08-30 RX ORDER — SODIUM CHLORIDE 0.9 % (FLUSH) 0.9 %
3 SYRINGE (ML) INJECTION AS NEEDED
Status: DISCONTINUED | OUTPATIENT
Start: 2019-08-30 | End: 2019-08-30 | Stop reason: HOSPADM

## 2019-08-30 RX ORDER — SODIUM CHLORIDE 0.9 % (FLUSH) 0.9 %
3 SYRINGE (ML) INJECTION EVERY 12 HOURS SCHEDULED
Status: DISCONTINUED | OUTPATIENT
Start: 2019-08-30 | End: 2019-08-30 | Stop reason: HOSPADM

## 2019-08-30 RX ORDER — HYDROMORPHONE HCL 110MG/55ML
PATIENT CONTROLLED ANALGESIA SYRINGE INTRAVENOUS AS NEEDED
Status: DISCONTINUED | OUTPATIENT
Start: 2019-08-30 | End: 2019-08-30 | Stop reason: SURG

## 2019-08-30 RX ORDER — MIDAZOLAM HYDROCHLORIDE 1 MG/ML
2 INJECTION INTRAMUSCULAR; INTRAVENOUS
Status: DISCONTINUED | OUTPATIENT
Start: 2019-08-30 | End: 2019-08-30 | Stop reason: HOSPADM

## 2019-08-30 RX ORDER — FAMOTIDINE 10 MG/ML
20 INJECTION, SOLUTION INTRAVENOUS
Status: COMPLETED | OUTPATIENT
Start: 2019-08-30 | End: 2019-08-30

## 2019-08-30 RX ORDER — SODIUM CHLORIDE 9 MG/ML
INJECTION, SOLUTION INTRAVENOUS AS NEEDED
Status: DISCONTINUED | OUTPATIENT
Start: 2019-08-30 | End: 2019-08-30 | Stop reason: HOSPADM

## 2019-08-30 RX ORDER — ACETAMINOPHEN 500 MG
1000 TABLET ORAL ONCE
Status: COMPLETED | OUTPATIENT
Start: 2019-08-30 | End: 2019-08-30

## 2019-08-30 RX ORDER — OXYCODONE AND ACETAMINOPHEN 7.5; 325 MG/1; MG/1
2 TABLET ORAL EVERY 4 HOURS PRN
Status: DISCONTINUED | OUTPATIENT
Start: 2019-08-30 | End: 2019-08-30 | Stop reason: HOSPADM

## 2019-08-30 RX ORDER — LIDOCAINE HYDROCHLORIDE 40 MG/ML
SOLUTION TOPICAL AS NEEDED
Status: DISCONTINUED | OUTPATIENT
Start: 2019-08-30 | End: 2019-08-30 | Stop reason: SURG

## 2019-08-30 RX ORDER — HYDROCODONE BITARTRATE AND ACETAMINOPHEN 5; 325 MG/1; MG/1
1 TABLET ORAL ONCE AS NEEDED
Status: DISCONTINUED | OUTPATIENT
Start: 2019-08-30 | End: 2019-08-30 | Stop reason: HOSPADM

## 2019-08-30 RX ORDER — IPRATROPIUM BROMIDE AND ALBUTEROL SULFATE 2.5; .5 MG/3ML; MG/3ML
3 SOLUTION RESPIRATORY (INHALATION) ONCE AS NEEDED
Status: DISCONTINUED | OUTPATIENT
Start: 2019-08-30 | End: 2019-08-30 | Stop reason: HOSPADM

## 2019-08-30 RX ORDER — MAGNESIUM HYDROXIDE 1200 MG/15ML
LIQUID ORAL AS NEEDED
Status: DISCONTINUED | OUTPATIENT
Start: 2019-08-30 | End: 2019-08-30 | Stop reason: HOSPADM

## 2019-08-30 RX ORDER — METOCLOPRAMIDE HYDROCHLORIDE 5 MG/ML
5 INJECTION INTRAMUSCULAR; INTRAVENOUS
Status: DISCONTINUED | OUTPATIENT
Start: 2019-08-30 | End: 2019-08-30 | Stop reason: HOSPADM

## 2019-08-30 RX ORDER — IBUPROFEN 600 MG/1
600 TABLET ORAL ONCE AS NEEDED
Status: DISCONTINUED | OUTPATIENT
Start: 2019-08-30 | End: 2019-08-30 | Stop reason: HOSPADM

## 2019-08-30 RX ORDER — NALOXONE HCL 0.4 MG/ML
0.4 VIAL (ML) INJECTION AS NEEDED
Status: DISCONTINUED | OUTPATIENT
Start: 2019-08-30 | End: 2019-08-30 | Stop reason: HOSPADM

## 2019-08-30 RX ORDER — FENTANYL CITRATE 50 UG/ML
25 INJECTION, SOLUTION INTRAMUSCULAR; INTRAVENOUS AS NEEDED
Status: COMPLETED | OUTPATIENT
Start: 2019-08-30 | End: 2019-08-30

## 2019-08-30 RX ORDER — OXYCODONE AND ACETAMINOPHEN 10; 325 MG/1; MG/1
1 TABLET ORAL ONCE AS NEEDED
Status: COMPLETED | OUTPATIENT
Start: 2019-08-30 | End: 2019-08-30

## 2019-08-30 RX ORDER — SODIUM CHLORIDE 0.9 % (FLUSH) 0.9 %
3-10 SYRINGE (ML) INJECTION AS NEEDED
Status: DISCONTINUED | OUTPATIENT
Start: 2019-08-30 | End: 2019-08-30 | Stop reason: HOSPADM

## 2019-08-30 RX ORDER — ROCURONIUM BROMIDE 10 MG/ML
INJECTION, SOLUTION INTRAVENOUS AS NEEDED
Status: DISCONTINUED | OUTPATIENT
Start: 2019-08-30 | End: 2019-08-30 | Stop reason: SURG

## 2019-08-30 RX ORDER — BUPIVACAINE HYDROCHLORIDE AND EPINEPHRINE 2.5; 5 MG/ML; UG/ML
INJECTION, SOLUTION INFILTRATION; PERINEURAL AS NEEDED
Status: DISCONTINUED | OUTPATIENT
Start: 2019-08-30 | End: 2019-08-30 | Stop reason: HOSPADM

## 2019-08-30 RX ORDER — SODIUM CHLORIDE, SODIUM LACTATE, POTASSIUM CHLORIDE, CALCIUM CHLORIDE 600; 310; 30; 20 MG/100ML; MG/100ML; MG/100ML; MG/100ML
100 INJECTION, SOLUTION INTRAVENOUS CONTINUOUS
Status: DISCONTINUED | OUTPATIENT
Start: 2019-08-30 | End: 2019-08-30 | Stop reason: HOSPADM

## 2019-08-30 RX ORDER — GLYCOPYRROLATE 0.2 MG/ML
INJECTION INTRAMUSCULAR; INTRAVENOUS AS NEEDED
Status: DISCONTINUED | OUTPATIENT
Start: 2019-08-30 | End: 2019-08-30 | Stop reason: SURG

## 2019-08-30 RX ORDER — KETOROLAC TROMETHAMINE 30 MG/ML
INJECTION, SOLUTION INTRAMUSCULAR; INTRAVENOUS AS NEEDED
Status: DISCONTINUED | OUTPATIENT
Start: 2019-08-30 | End: 2019-08-30 | Stop reason: SURG

## 2019-08-30 RX ORDER — SODIUM CHLORIDE, SODIUM LACTATE, POTASSIUM CHLORIDE, CALCIUM CHLORIDE 600; 310; 30; 20 MG/100ML; MG/100ML; MG/100ML; MG/100ML
1000 INJECTION, SOLUTION INTRAVENOUS CONTINUOUS
Status: DISCONTINUED | OUTPATIENT
Start: 2019-08-30 | End: 2019-08-30 | Stop reason: HOSPADM

## 2019-08-30 RX ADMIN — PROPOFOL 150 MG: 10 INJECTION, EMULSION INTRAVENOUS at 07:47

## 2019-08-30 RX ADMIN — SODIUM CHLORIDE, POTASSIUM CHLORIDE, SODIUM LACTATE AND CALCIUM CHLORIDE 1000 ML: 600; 310; 30; 20 INJECTION, SOLUTION INTRAVENOUS at 08:55

## 2019-08-30 RX ADMIN — FAMOTIDINE 20 MG: 10 INJECTION, SOLUTION INTRAVENOUS at 06:54

## 2019-08-30 RX ADMIN — ROCURONIUM BROMIDE 30 MG: 10 INJECTION INTRAVENOUS at 07:47

## 2019-08-30 RX ADMIN — FENTANYL CITRATE 25 MCG: 50 INJECTION, SOLUTION INTRAMUSCULAR; INTRAVENOUS at 08:36

## 2019-08-30 RX ADMIN — CEFAZOLIN 1 G: 1 INJECTION, POWDER, FOR SOLUTION INTRAMUSCULAR; INTRAVENOUS; PARENTERAL at 07:50

## 2019-08-30 RX ADMIN — LIDOCAINE HYDROCHLORIDE 1 EACH: 40 SOLUTION TOPICAL at 07:48

## 2019-08-30 RX ADMIN — FENTANYL CITRATE 25 MCG: 50 INJECTION, SOLUTION INTRAMUSCULAR; INTRAVENOUS at 08:38

## 2019-08-30 RX ADMIN — FENTANYL CITRATE 25 MCG: 50 INJECTION, SOLUTION INTRAMUSCULAR; INTRAVENOUS at 08:40

## 2019-08-30 RX ADMIN — PROPOFOL 50 MG: 10 INJECTION, EMULSION INTRAVENOUS at 07:49

## 2019-08-30 RX ADMIN — LABETALOL HYDROCHLORIDE 5 MG: 5 INJECTION INTRAVENOUS at 08:36

## 2019-08-30 RX ADMIN — MIDAZOLAM HYDROCHLORIDE 2 MG: 1 INJECTION, SOLUTION INTRAMUSCULAR; INTRAVENOUS at 06:54

## 2019-08-30 RX ADMIN — IPRATROPIUM BROMIDE AND ALBUTEROL SULFATE 3 ML: 2.5; .5 SOLUTION RESPIRATORY (INHALATION) at 06:54

## 2019-08-30 RX ADMIN — ACETAMINOPHEN 1000 MG: 500 TABLET, FILM COATED ORAL at 06:54

## 2019-08-30 RX ADMIN — OXYCODONE HYDROCHLORIDE AND ACETAMINOPHEN 1 TABLET: 10; 325 TABLET ORAL at 09:05

## 2019-08-30 RX ADMIN — FENTANYL CITRATE 50 MCG: 50 INJECTION INTRAMUSCULAR; INTRAVENOUS at 07:47

## 2019-08-30 RX ADMIN — FENTANYL CITRATE 50 MCG: 50 INJECTION INTRAMUSCULAR; INTRAVENOUS at 07:43

## 2019-08-30 RX ADMIN — HYDROMORPHONE HYDROCHLORIDE 1 MG: 2 INJECTION, SOLUTION INTRAMUSCULAR; INTRAVENOUS; SUBCUTANEOUS at 08:22

## 2019-08-30 RX ADMIN — ONDANSETRON HYDROCHLORIDE 4 MG: 2 SOLUTION INTRAMUSCULAR; INTRAVENOUS at 08:13

## 2019-08-30 RX ADMIN — Medication 3 MG: at 08:19

## 2019-08-30 RX ADMIN — FENTANYL CITRATE 25 MCG: 50 INJECTION, SOLUTION INTRAMUSCULAR; INTRAVENOUS at 08:39

## 2019-08-30 RX ADMIN — GLYCOPYRROLATE 0.4 MG: 0.2 INJECTION, SOLUTION INTRAMUSCULAR; INTRAVENOUS at 08:19

## 2019-08-30 RX ADMIN — LIDOCAINE HYDROCHLORIDE 100 MG: 20 INJECTION, SOLUTION INFILTRATION; PERINEURAL at 07:47

## 2019-08-30 RX ADMIN — FENTANYL CITRATE 50 MCG: 50 INJECTION INTRAMUSCULAR; INTRAVENOUS at 07:51

## 2019-08-30 RX ADMIN — SODIUM CHLORIDE, POTASSIUM CHLORIDE, SODIUM LACTATE AND CALCIUM CHLORIDE 1000 ML: 600; 310; 30; 20 INJECTION, SOLUTION INTRAVENOUS at 06:24

## 2019-08-30 RX ADMIN — KETOROLAC TROMETHAMINE 15 MG: 30 INJECTION, SOLUTION INTRAMUSCULAR at 08:17

## 2019-08-30 RX ADMIN — HYDROMORPHONE HYDROCHLORIDE 1 MG: 2 INJECTION, SOLUTION INTRAMUSCULAR; INTRAVENOUS; SUBCUTANEOUS at 08:19

## 2019-08-30 RX ADMIN — FENTANYL CITRATE 100 MCG: 50 INJECTION INTRAMUSCULAR; INTRAVENOUS at 07:55

## 2019-08-30 RX ADMIN — ROCURONIUM BROMIDE 10 MG: 10 INJECTION INTRAVENOUS at 07:53

## 2019-08-30 NOTE — ANESTHESIA PREPROCEDURE EVALUATION
Anesthesia Evaluation     Patient summary reviewed and Nursing notes reviewed   no history of anesthetic complications:  NPO Solid Status: > 8 hours  NPO Liquid Status: > 8 hours           Airway   Mallampati: II  TM distance: >3 FB  Neck ROM: full  No difficulty expected  Dental      Pulmonary    (+) asthma (no current medications, well controlled),   (-) not a smoker  Cardiovascular   Exercise tolerance: good (4-7 METS)    ECG reviewed    (+) hypertension (no current medications),       Neuro/Psych  (-) seizures, TIA, CVA  GI/Hepatic/Renal/Endo    (+) morbid obesity,    (-) liver disease, no renal disease, diabetes    Musculoskeletal     Abdominal    Substance History      OB/GYN          Other   (+) arthritis                     Anesthesia Plan    ASA 3     general     intravenous induction   Anesthetic plan, all risks, benefits, and alternatives have been provided, discussed and informed consent has been obtained with: patient.

## 2019-08-30 NOTE — ANESTHESIA POSTPROCEDURE EVALUATION
Patient: May Lacy    Procedure Summary     Date:  08/30/19 Room / Location:   [facility] OR  /  [facility]    Anesthesia Start:  0741 Anesthesia Stop:  0831    Procedure:  LAPAROSCOPIC CHOLECYSTECTOMY (N/A Abdomen) Diagnosis:  (BILIARY DYSKINESIA)    Surgeon:  Desire Khoury MD Provider:  Margy Holliday CRNA    Anesthesia Type:  general ASA Status:  3          Anesthesia Type: general  Last vitals  BP   126/66 (08/30/19 1000)   Temp   97.2 °F (36.2 °C) (08/30/19 0915)   Pulse   64 (08/30/19 1000)   Resp   16 (08/30/19 1000)     SpO2   93 % (08/30/19 1000)     Post Anesthesia Care and Evaluation    Patient location during evaluation: PACU  Patient participation: complete - patient participated  Level of consciousness: awake and alert  Pain management: adequate  Airway patency: patent  Anesthetic complications: No anesthetic complications    Cardiovascular status: acceptable  Respiratory status: acceptable  Hydration status: acceptable    Comments: Blood pressure 126/66, pulse 64, temperature 97.2 °F (36.2 °C), temperature source Temporal, resp. rate 16, SpO2 93 %, not currently breastfeeding.    Pt discharged from PACU based on meryl score >8

## 2019-08-30 NOTE — ANESTHESIA POSTPROCEDURE EVALUATION
Patient: May Lacy    Procedure Summary     Date:  08/30/19 Room / Location:   [facility]  /  [facility]    Anesthesia Start:  0741 Anesthesia Stop:  0831    Procedure:  LAPAROSCOPIC CHOLECYSTECTOMY (N/A Abdomen) Diagnosis:  (BILIARY DYSKINESIA)    Surgeon:  Desire Khoury MD Provider:  Margy Holliday CRNA    Anesthesia Type:  general ASA Status:  3          Anesthesia Type: general  Last vitals  BP   118/69 (08/30/19 0922)   Temp   97.2 °F (36.2 °C) (08/30/19 0915)   Pulse   76 (08/30/19 0922)   Resp   16 (08/30/19 0922)     SpO2   96 % (08/30/19 0922)     Post Anesthesia Care and Evaluation    Patient location during evaluation: PACU  Patient participation: complete - patient participated  Level of consciousness: awake and alert  Pain management: adequate  Airway patency: patent  Anesthetic complications: No anesthetic complications    Cardiovascular status: acceptable  Respiratory status: acceptable  Hydration status: acceptable    Comments: Blood pressure 118/69, pulse 76, temperature 97.2 °F (36.2 °C), temperature source Temporal, resp. rate 16, SpO2 96 %, not currently breastfeeding.    Pt discharged from PACU based on meryl score >8

## 2019-08-30 NOTE — ANESTHESIA PROCEDURE NOTES
Airway  Urgency: elective    Airway not difficult    General Information and Staff    Patient location during procedure: OR  CRNA: Margy Holliday CRNA    Indications and Patient Condition  Indications for airway management: airway protection    Preoxygenated: yes  Mask difficulty assessment: 2 - vent by mask + OA or adjuvant +/- NMBA    Final Airway Details  Final airway type: endotracheal airway      Successful airway: ETT  Cuffed: yes   Successful intubation technique: direct laryngoscopy  Facilitating devices/methods: intubating stylet  Endotracheal tube insertion site: oral  Blade: Rodarte  Blade size: 2  ETT size (mm): 7.0  Cormack-Lehane Classification: grade IIa - partial view of glottis  Placement verified by: chest auscultation and capnometry   Cuff volume (mL): 5  Measured from: lips  ETT to lips (cm): 22  Number of attempts at approach: 1    Additional Comments  Atraumatic

## 2019-09-03 LAB
LAB AP CASE REPORT: NORMAL
PATH REPORT.FINAL DX SPEC: NORMAL
PATH REPORT.GROSS SPEC: NORMAL

## 2022-06-24 ENCOUNTER — HOSPITAL ENCOUNTER (EMERGENCY)
Facility: HOSPITAL | Age: 37
Discharge: HOME | End: 2022-06-24
Attending: EMERGENCY MEDICINE | Admitting: EMERGENCY MEDICINE

## 2022-06-24 VITALS
RESPIRATION RATE: 16 BRPM | SYSTOLIC BLOOD PRESSURE: 158 MMHG | BODY MASS INDEX: 41.32 KG/M2 | HEART RATE: 81 BPM | WEIGHT: 242 LBS | TEMPERATURE: 97.3 F | OXYGEN SATURATION: 100 % | DIASTOLIC BLOOD PRESSURE: 92 MMHG | HEIGHT: 64 IN

## 2022-06-24 DIAGNOSIS — H60.311 ACUTE DIFFUSE OTITIS EXTERNA OF RIGHT EAR: Primary | ICD-10-CM

## 2022-06-24 PROCEDURE — 99283 EMERGENCY DEPT VISIT LOW MDM: CPT

## 2022-06-24 RX ORDER — CIPROFLOXACIN AND DEXAMETHASONE 3; 1 MG/ML; MG/ML
4 SUSPENSION/ DROPS AURICULAR (OTIC) 2 TIMES DAILY
Qty: 7.5 ML | Refills: 0 | Status: SHIPPED | OUTPATIENT
Start: 2022-06-24

## 2022-06-24 RX ORDER — TRAMADOL HYDROCHLORIDE 50 MG/1
50 TABLET ORAL EVERY 8 HOURS PRN
Qty: 6 TABLET | Refills: 0 | Status: SHIPPED | OUTPATIENT
Start: 2022-06-24

## 2022-06-24 RX ORDER — CIPROFLOXACIN AND DEXAMETHASONE 3; 1 MG/ML; MG/ML
4 SUSPENSION/ DROPS AURICULAR (OTIC) 2 TIMES DAILY
Status: DISCONTINUED | OUTPATIENT
Start: 2022-06-24 | End: 2022-06-24 | Stop reason: HOSPADM

## 2022-06-24 RX ORDER — HYDROCODONE BITARTRATE AND ACETAMINOPHEN 5; 325 MG/1; MG/1
1 TABLET ORAL ONCE
Status: COMPLETED | OUTPATIENT
Start: 2022-06-24 | End: 2022-06-24

## 2022-06-24 RX ADMIN — HYDROCODONE BITARTRATE AND ACETAMINOPHEN 1 TABLET: 5; 325 TABLET ORAL at 09:02

## 2022-06-24 RX ADMIN — CIPROFLOXACIN AND DEXAMETHASONE 4 DROP: 3; 1 SUSPENSION/ DROPS AURICULAR (OTIC) at 08:55

## 2022-06-24 NOTE — ED NOTES
Pt in bed. No s/s distress noted. Denies needs. Asmt completed. Pt states R ear started hurting and drainage las tonight. Denies fever/chills. States feels swollen. Noted yellow, thin drainage dried on ear. Denies tinnitus. POC discussed. Verbalized understanding. Will cont monitoring. Call light in reach. Advised to call any needs.

## 2022-06-24 NOTE — ED PROVIDER NOTES
Subjective   Patient is a 36-year-old female who presents to the ER with right ear pain.  Patient states her right ear canal has been painful and swollen with drainage since last night.  Patient says she has a lot of pressure in her ear.  She has been using over-the-counter medication with no improvement.  She denies any recent swimming.  She denies any fever, chest pain, shortness of air, abdominal pain, nausea vomiting diarrhea, urinary changes, neurologic changes.          Review of Systems   Constitutional: Negative.    HENT: Positive for ear discharge and ear pain.    Eyes: Negative.    Respiratory: Negative.    Cardiovascular: Negative.    Gastrointestinal: Negative.    Endocrine: Negative.    Genitourinary: Negative.    Musculoskeletal: Negative.    Skin: Negative.    Allergic/Immunologic: Negative.    Neurological: Negative.    Hematological: Negative.    Psychiatric/Behavioral: Negative.    All other systems reviewed and are negative.      Past Medical History:   Diagnosis Date   • Arthritis    • Asthma    • Cat scratch fever    • Hypertension    • Precancerous changes of the cervix        No Known Allergies    Past Surgical History:   Procedure Laterality Date   •  SECTION     • CHOLECYSTECTOMY WITH INTRAOPERATIVE CHOLANGIOGRAM N/A 2019    Procedure: LAPAROSCOPIC CHOLECYSTECTOMY;  Surgeon: Desire Khoury MD;  Location: [facility];  Service: General   • HYSTERECTOMY     • NECK SURGERY      INCISION FOR TISSUE BUILD UP ON LEFT SIDE OF NECK FOR CAT SCRATCH FEVER   • OOPHORECTOMY         Family History   Problem Relation Age of Onset   • BRCA 1/2 Neg Hx    • Breast cancer Neg Hx    • Colon cancer Neg Hx    • Endometrial cancer Neg Hx    • Ovarian cancer Neg Hx    • Uterine cancer Neg Hx        Social History     Socioeconomic History   • Marital status:    Tobacco Use   • Smoking status: Never Smoker   • Smokeless tobacco: Never Used   Substance and Sexual Activity   • Alcohol use: No   •  Drug use: No   • Sexual activity: Defer           Objective   Physical Exam  Vitals and nursing note reviewed.   Constitutional:       Appearance: She is well-developed.   HENT:      Head: Normocephalic and atraumatic.      Comments: Right ear canal was edematous with a very small opening, yellow drainage, unable to visualize the TM due to swelling of the canal, no mastoid tenderness or swelling     Right Ear: External ear normal.      Left Ear: Tympanic membrane, ear canal and external ear normal.   Eyes:      Conjunctiva/sclera: Conjunctivae normal.      Pupils: Pupils are equal, round, and reactive to light.   Cardiovascular:      Rate and Rhythm: Normal rate and regular rhythm.      Heart sounds: Normal heart sounds.   Pulmonary:      Effort: Pulmonary effort is normal.      Breath sounds: Normal breath sounds.   Abdominal:      Palpations: Abdomen is soft.      Tenderness: There is no abdominal tenderness.   Musculoskeletal:         General: No deformity. Normal range of motion.      Cervical back: Normal range of motion.   Skin:     General: Skin is warm.   Neurological:      Mental Status: She is alert and oriented to person, place, and time.      Cranial Nerves: Cranial nerves are intact.   Psychiatric:         Behavior: Behavior normal.         Procedures           ED Course      An earwick was placed and patient was given Ciprodex otic suspension.  Work-up consistent with otitis externa.  Patient will be discharged home with Ciprodex otic suspension to follow-up with her PCP.  ENT number was also given.  Return for any worse or new pain, fever or other concerns.    The patient has Acute Otitis Externa and was not prescribed oral/systemic antibiotics today.      Patient was given lortab for pain control.  She was discharged with a short course of ultram for pain control.     CORIN query complete. Treatment plan to include limited course of prescribed  controlled substance. Risks including addiction,  benefits, and alternatives presented to patient.                         CORIN reviewed by Sofía Schultz MD       [facility]    Final diagnoses:   Acute diffuse otitis externa of right ear       ED Disposition  ED Disposition     ED Disposition   Discharge    Condition   Good    Comment   --             Aaron Clay Jr., MD  [address]  [facility] [address]  [phone]    Schedule an appointment as soon as possible for a visit       Ricardo Collado MD  [address] [facility] [address]  [place] [id number]  [phone]    Schedule an appointment as soon as possible for a visit            Medication List      New Prescriptions    ciprofloxacin-dexamethasone 0.3-0.1 % otic suspension  Commonly known as: CIPRODEX  Administer 4 drops to the right ear 2 (Two) Times a Day. Use for one week     traMADol 50 MG tablet  Commonly known as: ULTRAM  Take 1 tablet by mouth Every 8 (Eight) Hours As Needed for Moderate Pain .           Where to Get Your Medications      These medications were sent to [facility] [address] [place] [address] - [address]  [phone] [facility] [phone]   [address] [facility] [address]    Phone: [phone]   · ciprofloxacin-dexamethasone 0.3-0.1 % otic suspension  · traMADol 50 MG tablet          Sofía Schultz MD  [phone]

## 2023-07-05 ENCOUNTER — HOSPITAL ENCOUNTER (EMERGENCY)
Age: 38
Discharge: HOME | End: 2023-07-06
Attending: EMERGENCY MEDICINE
Payer: COMMERCIAL

## 2023-07-05 DIAGNOSIS — R07.89 ATYPICAL CHEST PAIN: Primary | ICD-10-CM

## 2023-07-05 PROCEDURE — 99285 EMERGENCY DEPT VISIT HI MDM: CPT

## 2023-07-05 RX ORDER — VALACYCLOVIR HYDROCHLORIDE 500 MG/1
500 TABLET, FILM COATED ORAL 2 TIMES DAILY
COMMUNITY

## 2023-07-05 RX ORDER — LOSARTAN POTASSIUM 100 MG/1
100 TABLET ORAL DAILY
COMMUNITY

## 2023-07-05 RX ORDER — MELOXICAM 7.5 MG/1
7.5 TABLET ORAL DAILY
COMMUNITY

## 2023-07-06 ENCOUNTER — APPOINTMENT (OUTPATIENT)
Dept: GENERAL RADIOLOGY | Age: 38
End: 2023-07-06
Payer: COMMERCIAL

## 2023-07-06 VITALS
WEIGHT: 220 LBS | TEMPERATURE: 98.4 F | RESPIRATION RATE: 16 BRPM | OXYGEN SATURATION: 98 % | SYSTOLIC BLOOD PRESSURE: 133 MMHG | BODY MASS INDEX: 37.56 KG/M2 | HEART RATE: 73 BPM | HEIGHT: 64 IN | DIASTOLIC BLOOD PRESSURE: 92 MMHG

## 2023-07-06 LAB
ALBUMIN SERPL-MCNC: 4.2 G/DL (ref 3.5–5.2)
ALP SERPL-CCNC: 96 U/L (ref 35–104)
ALT SERPL-CCNC: 21 U/L (ref 5–33)
ANION GAP SERPL CALCULATED.3IONS-SCNC: 9 MMOL/L (ref 7–19)
AST SERPL-CCNC: 27 U/L (ref 5–32)
BASOPHILS # BLD: 0 K/UL (ref 0–0.2)
BASOPHILS NFR BLD: 0.7 % (ref 0–1)
BILIRUB SERPL-MCNC: 0.4 MG/DL (ref 0.2–1.2)
BUN SERPL-MCNC: 12 MG/DL (ref 6–20)
CALCIUM SERPL-MCNC: 9.7 MG/DL (ref 8.6–10)
CHLORIDE SERPL-SCNC: 103 MMOL/L (ref 98–111)
CO2 SERPL-SCNC: 26 MMOL/L (ref 22–29)
CREAT SERPL-MCNC: 0.6 MG/DL (ref 0.5–0.9)
D DIMER PPP FEU-MCNC: <0.27 UG/ML FEU (ref 0–0.48)
EOSINOPHIL # BLD: 0.1 K/UL (ref 0–0.6)
EOSINOPHIL NFR BLD: 1.7 % (ref 0–5)
ERYTHROCYTE [DISTWIDTH] IN BLOOD BY AUTOMATED COUNT: 12.3 % (ref 11.5–14.5)
GLUCOSE SERPL-MCNC: 101 MG/DL (ref 74–109)
HCT VFR BLD AUTO: 39.9 % (ref 37–47)
HGB BLD-MCNC: 13.3 G/DL (ref 12–16)
IMM GRANULOCYTES # BLD: 0 K/UL
LYMPHOCYTES # BLD: 1.5 K/UL (ref 1.1–4.5)
LYMPHOCYTES NFR BLD: 24.4 % (ref 20–40)
MCH RBC QN AUTO: 30.4 PG (ref 27–31)
MCHC RBC AUTO-ENTMCNC: 33.3 G/DL (ref 33–37)
MCV RBC AUTO: 91.1 FL (ref 81–99)
MONOCYTES # BLD: 0.4 K/UL (ref 0–0.9)
MONOCYTES NFR BLD: 5.8 % (ref 0–10)
NEUTROPHILS # BLD: 4.1 K/UL (ref 1.5–7.5)
NEUTS SEG NFR BLD: 67.1 % (ref 50–65)
PLATELET # BLD AUTO: 239 K/UL (ref 130–400)
PMV BLD AUTO: 10.3 FL (ref 9.4–12.3)
POTASSIUM SERPL-SCNC: 4.2 MMOL/L (ref 3.5–5)
PROT SERPL-MCNC: 7.4 G/DL (ref 6.6–8.7)
RBC # BLD AUTO: 4.38 M/UL (ref 4.2–5.4)
SODIUM SERPL-SCNC: 138 MMOL/L (ref 136–145)
TROPONIN T SERPL-MCNC: <0.01 NG/ML (ref 0–0.03)
TROPONIN T SERPL-MCNC: <0.01 NG/ML (ref 0–0.03)
WBC # BLD AUTO: 6.1 K/UL (ref 4.8–10.8)

## 2023-07-06 PROCEDURE — 85025 COMPLETE CBC W/AUTO DIFF WBC: CPT

## 2023-07-06 PROCEDURE — 80053 COMPREHEN METABOLIC PANEL: CPT

## 2023-07-06 PROCEDURE — 84484 ASSAY OF TROPONIN QUANT: CPT

## 2023-07-06 PROCEDURE — 93005 ELECTROCARDIOGRAM TRACING: CPT | Performed by: EMERGENCY MEDICINE

## 2023-07-06 PROCEDURE — 36415 COLL VENOUS BLD VENIPUNCTURE: CPT

## 2023-07-06 PROCEDURE — 71045 X-RAY EXAM CHEST 1 VIEW: CPT

## 2023-07-06 PROCEDURE — 85379 FIBRIN DEGRADATION QUANT: CPT

## 2023-07-06 ASSESSMENT — PAIN - FUNCTIONAL ASSESSMENT
PAIN_FUNCTIONAL_ASSESSMENT: 0-10
PAIN_FUNCTIONAL_ASSESSMENT: NONE - DENIES PAIN

## 2023-07-06 ASSESSMENT — PAIN SCALES - GENERAL: PAINLEVEL_OUTOF10: 4

## 2023-07-07 LAB
EKG P AXIS: 6 DEGREES
EKG P-R INTERVAL: 158 MS
EKG Q-T INTERVAL: 436 MS
EKG QRS DURATION: 92 MS
EKG QTC CALCULATION (BAZETT): 426 MS
EKG T AXIS: 38 DEGREES

## 2023-07-07 PROCEDURE — 93010 ELECTROCARDIOGRAM REPORT: CPT | Performed by: INTERNAL MEDICINE

## 2023-07-17 ENCOUNTER — TRANSCRIBE ORDERS (OUTPATIENT)
Dept: ADMINISTRATIVE | Age: 38
End: 2023-07-17

## 2023-07-17 DIAGNOSIS — R07.9 CHEST PAIN, UNSPECIFIED TYPE: Primary | ICD-10-CM

## 2023-07-19 ASSESSMENT — ENCOUNTER SYMPTOMS
DIARRHEA: 0
COUGH: 0
ABDOMINAL PAIN: 0
VOMITING: 0
SHORTNESS OF BREATH: 1
NAUSEA: 0

## 2023-08-21 ENCOUNTER — HOSPITAL ENCOUNTER (OUTPATIENT)
Dept: NON INVASIVE DIAGNOSTICS | Age: 38
Discharge: HOME | End: 2023-08-21
Admitting: PEDIATRICS
Payer: COMMERCIAL

## 2023-08-21 DIAGNOSIS — R07.9 CHEST PAIN, UNSPECIFIED TYPE: ICD-10-CM

## 2023-08-21 LAB
LV EF: 50 %
LVEF MODALITY: NORMAL

## 2023-08-21 PROCEDURE — 93350 STRESS TTE ONLY: CPT

## 2024-09-11 SDOH — HEALTH STABILITY: PHYSICAL HEALTH: ON AVERAGE, HOW MANY MINUTES DO YOU ENGAGE IN EXERCISE AT THIS LEVEL?: 20 MIN

## 2024-09-11 SDOH — HEALTH STABILITY: PHYSICAL HEALTH: ON AVERAGE, HOW MANY DAYS PER WEEK DO YOU ENGAGE IN MODERATE TO STRENUOUS EXERCISE (LIKE A BRISK WALK)?: 2 DAYS

## 2024-09-12 ENCOUNTER — OFFICE VISIT (OUTPATIENT)
Dept: PRIMARY CARE CLINIC | Age: 39
End: 2024-09-12
Payer: COMMERCIAL

## 2024-09-12 VITALS
BODY MASS INDEX: 43.64 KG/M2 | TEMPERATURE: 97.7 F | OXYGEN SATURATION: 98 % | HEIGHT: 64 IN | SYSTOLIC BLOOD PRESSURE: 132 MMHG | DIASTOLIC BLOOD PRESSURE: 84 MMHG | HEART RATE: 79 BPM | WEIGHT: 255.6 LBS

## 2024-09-12 DIAGNOSIS — Z13.1 SCREENING FOR DIABETES MELLITUS: Primary | ICD-10-CM

## 2024-09-12 DIAGNOSIS — Z13.220 ENCOUNTER FOR SCREENING FOR LIPID DISORDER: ICD-10-CM

## 2024-09-12 DIAGNOSIS — R23.2 HOT FLASHES: ICD-10-CM

## 2024-09-12 DIAGNOSIS — I10 PRIMARY HYPERTENSION: ICD-10-CM

## 2024-09-12 DIAGNOSIS — R63.5 WEIGHT GAIN: ICD-10-CM

## 2024-09-12 PROCEDURE — 3075F SYST BP GE 130 - 139MM HG: CPT | Performed by: NURSE PRACTITIONER

## 2024-09-12 PROCEDURE — 99203 OFFICE O/P NEW LOW 30 MIN: CPT | Performed by: NURSE PRACTITIONER

## 2024-09-12 PROCEDURE — 3079F DIAST BP 80-89 MM HG: CPT | Performed by: NURSE PRACTITIONER

## 2024-09-12 RX ORDER — SUMATRIPTAN 50 MG/1
50 TABLET, FILM COATED ORAL
COMMUNITY

## 2024-09-12 RX ORDER — ALBUTEROL SULFATE 90 UG/1
2 INHALANT RESPIRATORY (INHALATION) EVERY 6 HOURS PRN
COMMUNITY

## 2024-09-12 RX ORDER — AMLODIPINE BESYLATE 5 MG/1
5 TABLET ORAL NIGHTLY
Qty: 30 TABLET | Refills: 3 | Status: SHIPPED | OUTPATIENT
Start: 2024-09-12

## 2024-09-12 RX ORDER — L.ACIDOPHIL/L.PLANTAR/BIFIDO 7 15B CELL
CAPSULE ORAL
COMMUNITY

## 2024-09-12 SDOH — ECONOMIC STABILITY: FOOD INSECURITY: WITHIN THE PAST 12 MONTHS, THE FOOD YOU BOUGHT JUST DIDN'T LAST AND YOU DIDN'T HAVE MONEY TO GET MORE.: NEVER TRUE

## 2024-09-12 SDOH — ECONOMIC STABILITY: FOOD INSECURITY: WITHIN THE PAST 12 MONTHS, YOU WORRIED THAT YOUR FOOD WOULD RUN OUT BEFORE YOU GOT MONEY TO BUY MORE.: NEVER TRUE

## 2024-09-12 SDOH — ECONOMIC STABILITY: INCOME INSECURITY: HOW HARD IS IT FOR YOU TO PAY FOR THE VERY BASICS LIKE FOOD, HOUSING, MEDICAL CARE, AND HEATING?: NOT HARD AT ALL

## 2024-09-12 ASSESSMENT — PATIENT HEALTH QUESTIONNAIRE - PHQ9
6. FEELING BAD ABOUT YOURSELF - OR THAT YOU ARE A FAILURE OR HAVE LET YOURSELF OR YOUR FAMILY DOWN: NOT AT ALL
1. LITTLE INTEREST OR PLEASURE IN DOING THINGS: NEARLY EVERY DAY
4. FEELING TIRED OR HAVING LITTLE ENERGY: NEARLY EVERY DAY
3. TROUBLE FALLING OR STAYING ASLEEP: NEARLY EVERY DAY
SUM OF ALL RESPONSES TO PHQ QUESTIONS 1-9: 14
SUM OF ALL RESPONSES TO PHQ QUESTIONS 1-9: 14
10. IF YOU CHECKED OFF ANY PROBLEMS, HOW DIFFICULT HAVE THESE PROBLEMS MADE IT FOR YOU TO DO YOUR WORK, TAKE CARE OF THINGS AT HOME, OR GET ALONG WITH OTHER PEOPLE: VERY DIFFICULT
7. TROUBLE CONCENTRATING ON THINGS, SUCH AS READING THE NEWSPAPER OR WATCHING TELEVISION: NEARLY EVERY DAY
SUM OF ALL RESPONSES TO PHQ QUESTIONS 1-9: 14
SUM OF ALL RESPONSES TO PHQ9 QUESTIONS 1 & 2: 5
5. POOR APPETITE OR OVEREATING: NOT AT ALL
9. THOUGHTS THAT YOU WOULD BE BETTER OFF DEAD, OR OF HURTING YOURSELF: NOT AT ALL
SUM OF ALL RESPONSES TO PHQ QUESTIONS 1-9: 14
2. FEELING DOWN, DEPRESSED OR HOPELESS: MORE THAN HALF THE DAYS
8. MOVING OR SPEAKING SO SLOWLY THAT OTHER PEOPLE COULD HAVE NOTICED. OR THE OPPOSITE, BEING SO FIGETY OR RESTLESS THAT YOU HAVE BEEN MOVING AROUND A LOT MORE THAN USUAL: NOT AT ALL

## 2024-09-16 PROBLEM — I10 PRIMARY HYPERTENSION: Status: ACTIVE | Noted: 2024-09-16

## 2024-09-19 ENCOUNTER — OFFICE VISIT (OUTPATIENT)
Dept: PRIMARY CARE CLINIC | Age: 39
End: 2024-09-19
Payer: COMMERCIAL

## 2024-09-19 VITALS
HEART RATE: 92 BPM | OXYGEN SATURATION: 98 % | TEMPERATURE: 97.9 F | DIASTOLIC BLOOD PRESSURE: 80 MMHG | SYSTOLIC BLOOD PRESSURE: 122 MMHG | WEIGHT: 255.4 LBS | BODY MASS INDEX: 43.84 KG/M2

## 2024-09-19 DIAGNOSIS — I10 PRIMARY HYPERTENSION: Primary | ICD-10-CM

## 2024-09-19 PROCEDURE — 3074F SYST BP LT 130 MM HG: CPT | Performed by: NURSE PRACTITIONER

## 2024-09-19 PROCEDURE — 3079F DIAST BP 80-89 MM HG: CPT | Performed by: NURSE PRACTITIONER

## 2024-09-19 PROCEDURE — 99213 OFFICE O/P EST LOW 20 MIN: CPT | Performed by: NURSE PRACTITIONER

## 2024-09-19 RX ORDER — TIRZEPATIDE 2.5 MG/.5ML
2.5 INJECTION, SOLUTION SUBCUTANEOUS WEEKLY
Qty: 2 ML | Refills: 0 | Status: SHIPPED | OUTPATIENT
Start: 2024-09-19

## 2024-09-19 RX ORDER — ESTRADIOL 1 MG/1
1 TABLET ORAL DAILY
Qty: 90 TABLET | Refills: 5 | Status: SHIPPED | OUTPATIENT
Start: 2024-09-19

## 2024-09-19 RX ORDER — ONDANSETRON 4 MG/1
4 TABLET, FILM COATED ORAL 3 TIMES DAILY PRN
Qty: 30 TABLET | Refills: 0 | Status: SHIPPED | OUTPATIENT
Start: 2024-09-19

## 2024-09-23 ASSESSMENT — ENCOUNTER SYMPTOMS
RESPIRATORY NEGATIVE: 1
EYES NEGATIVE: 1
GASTROINTESTINAL NEGATIVE: 1

## 2024-09-27 DIAGNOSIS — I10 PRIMARY HYPERTENSION: ICD-10-CM

## 2024-09-27 RX ORDER — TIRZEPATIDE 2.5 MG/.5ML
2.5 INJECTION, SOLUTION SUBCUTANEOUS WEEKLY
Qty: 2 ML | Refills: 0 | OUTPATIENT
Start: 2024-09-27

## 2024-10-10 RX ORDER — LOSARTAN POTASSIUM 100 MG/1
100 TABLET ORAL DAILY
Qty: 90 TABLET | Refills: 1 | Status: SHIPPED | OUTPATIENT
Start: 2024-10-10

## 2024-11-14 ENCOUNTER — OFFICE VISIT (OUTPATIENT)
Dept: PRIMARY CARE CLINIC | Age: 39
End: 2024-11-14
Payer: COMMERCIAL

## 2024-11-14 VITALS
WEIGHT: 248 LBS | OXYGEN SATURATION: 97 % | SYSTOLIC BLOOD PRESSURE: 124 MMHG | TEMPERATURE: 97.9 F | RESPIRATION RATE: 16 BRPM | DIASTOLIC BLOOD PRESSURE: 80 MMHG | HEIGHT: 64 IN | HEART RATE: 64 BPM | BODY MASS INDEX: 42.34 KG/M2

## 2024-11-14 DIAGNOSIS — I10 PRIMARY HYPERTENSION: ICD-10-CM

## 2024-11-14 DIAGNOSIS — K58.0 IRRITABLE BOWEL SYNDROME WITH DIARRHEA: Primary | ICD-10-CM

## 2024-11-14 PROCEDURE — 3074F SYST BP LT 130 MM HG: CPT | Performed by: NURSE PRACTITIONER

## 2024-11-14 PROCEDURE — 3079F DIAST BP 80-89 MM HG: CPT | Performed by: NURSE PRACTITIONER

## 2024-11-14 PROCEDURE — 99213 OFFICE O/P EST LOW 20 MIN: CPT | Performed by: NURSE PRACTITIONER

## 2024-11-14 RX ORDER — DICYCLOMINE HYDROCHLORIDE 10 MG/1
10 CAPSULE ORAL
Qty: 120 CAPSULE | Refills: 4 | Status: SHIPPED | OUTPATIENT
Start: 2024-11-14

## 2024-11-14 ASSESSMENT — ENCOUNTER SYMPTOMS: DIARRHEA: 1

## 2024-11-14 NOTE — PATIENT INSTRUCTIONS
Continue the losartan and amolodipne for bp  Use the dicyclomine before meals to settle stomach and diarrhea should improve  If it does not then will do referral to gi doctor.   Call the insurance and ask about weight loss if anything is covered.   If bp still high bring cuff in any day and have us compare it.

## 2024-11-14 NOTE — PROGRESS NOTES
Bentyl does not help her stomach issues we can get her into GI  PDMP Monitoring:    Last PDMP [place] as Reviewed:  Review User Review Instant Review Result            Urine Drug Screenings (1 yr)    No resulted procedures found.       Medication Contract and Consent for Opioid Use Documents Filed        No documents found                     Patient given educational materials -see patient instructions.  Discussed use, benefit, and side effects of prescribed medications.  All patient questions answered.  Pt voiced understanding. Reviewed health maintenance.  Instructed to continue currentmedications, diet and exercise.  Patient agreed with treatment plan. Follow up as directed.   MEDICATIONS:  Orders Placed This Encounter   Medications    dicyclomine (BENTYL) 10 MG capsule     Sig: Take 1 capsule by mouth 4 times daily (before meals and nightly) For IBS     Dispense:  120 capsule     Refill:  4         ORDERS:  No orders of the defined types were placed in this encounter.      Follow-up:  Return in about 6 months (around 5/14/2025).    PATIENT INSTRUCTIONS:  Patient Instructions   Continue the losartan and amolodipne for bp  Use the dicyclomine before meals to settle stomach and diarrhea should improve  If it does not then will do referral to gi doctor.   Call the insurance and ask about weight loss if anything is covered.   If bp still high bring cuff in any day and have us compare it.   May start a probiotic otc to see if it helps with stomach.   Electronically signed by DESIREE Wilkinson CNP on 11/14/2024 at 4:55 PM    EMR Dragon/transcription disclaimer:  Much of thisencounter note is electronic transcription/translation of spoken language to printed texts.  The electronic translation of spoken language may be erroneous, or at times, nonsensical words or phrases may be inadvertentlytranscribed.  Although I have reviewed the note for such errors, some may still exist.

## 2024-12-09 ENCOUNTER — PATIENT MESSAGE (OUTPATIENT)
Dept: PRIMARY CARE CLINIC | Age: 39
End: 2024-12-09

## 2024-12-11 RX ORDER — VALACYCLOVIR HYDROCHLORIDE 500 MG/1
500 TABLET, FILM COATED ORAL 2 TIMES DAILY
Qty: 60 TABLET | Refills: 2 | Status: SHIPPED | OUTPATIENT
Start: 2024-12-11

## 2025-01-05 RX ORDER — AMLODIPINE BESYLATE 5 MG/1
TABLET ORAL
Qty: 90 TABLET | Refills: 0 | Status: SHIPPED | OUTPATIENT
Start: 2025-01-05

## 2025-01-07 RX ORDER — AMLODIPINE BESYLATE 5 MG/1
5 TABLET ORAL DAILY
Qty: 90 TABLET | Refills: 0 | Status: SHIPPED | OUTPATIENT
Start: 2025-01-07

## (undated) DEVICE — MONOPOLAR METZENBAUM SCISSOR, MINI BLADE TIP, DISPOSABLE: Brand: MONOPOLAR METZENBAUM SCISSOR, MINI BLADE TIP, DISPOSABLE

## (undated) DEVICE — ENDOPOUCH RETRIEVER SPECIMEN RETRIEVAL BAGS: Brand: ENDOPOUCH RETRIEVER

## (undated) DEVICE — ANTIBACTERIAL UNDYED BRAIDED (POLYGLACTIN 910), SYNTHETIC ABSORBABLE SUTURE: Brand: COATED VICRYL

## (undated) DEVICE — PAD LAP CHOLE: Brand: MEDLINE INDUSTRIES, INC.

## (undated) DEVICE — ENDOPATH XCEL WITH OPTIVIEW TECHNOLOGY UNIVERSAL TROCAR STABILITY SLEEVES: Brand: ENDOPATH XCEL OPTIVIEW

## (undated) DEVICE — ENDOPATH XCEL WITH OPTIVIEW TECHNOLOGY DILATING TIP TROCARS WITH STABILITY SLEEVES: Brand: ENDOPATH XCEL OPTIVIEW

## (undated) DEVICE — PK TURNOVER RM ADV

## (undated) DEVICE — ENDOPATH PNEUMONEEDLE INSUFFLATION NEEDLES WITH LUER LOCK CONNECTORS 120MM: Brand: ENDOPATH

## (undated) DEVICE — TRY PREP SCRB VAG PVP

## (undated) DEVICE — CLIP APPLIER: Brand: ENDO CLIP

## (undated) DEVICE — SUT VIC 0 SUTUPAK TIES 18IN J906G

## (undated) DEVICE — ELECTRD BLD EDGE/INSUL1P 2.4X5.1MM STRL

## (undated) DEVICE — ENDOPATH XCEL DILATING TIP TROCARS WITH STABILITY SLEEVES: Brand: ENDOPATH XCEL

## (undated) DEVICE — 2, DISPOSABLE SUCTION/IRRIGATOR WITHOUT DISPOSABLE TIP: Brand: STRYKEFLOW

## (undated) DEVICE — GLV SURG BIOGEL LTX PF 6 1/2

## (undated) DEVICE — TOWEL,OR,DSP,ST,BLUE,STD,4/PK,20PK/CS: Brand: MEDLINE

## (undated) DEVICE — 3M™ STERI-STRIP™ REINFORCED ADHESIVE SKIN CLOSURES, R1547, 1/2 IN X 4 IN (12 MM X 100 MM), 6 STRIPS/ENVELOPE: Brand: 3M™ STERI-STRIP™

## (undated) DEVICE — ELECTRD L HK EZ CLN 33CM